# Patient Record
Sex: MALE | NOT HISPANIC OR LATINO | Employment: OTHER | URBAN - METROPOLITAN AREA
[De-identification: names, ages, dates, MRNs, and addresses within clinical notes are randomized per-mention and may not be internally consistent; named-entity substitution may affect disease eponyms.]

---

## 2024-01-09 ENCOUNTER — LAB (OUTPATIENT)
Dept: LAB | Facility: HOSPITAL | Age: 79
End: 2024-01-09
Payer: COMMERCIAL

## 2024-01-09 ENCOUNTER — OFFICE VISIT (OUTPATIENT)
Dept: FAMILY MEDICINE CLINIC | Facility: CLINIC | Age: 79
End: 2024-01-09
Payer: COMMERCIAL

## 2024-01-09 VITALS
WEIGHT: 175.6 LBS | RESPIRATION RATE: 20 BRPM | DIASTOLIC BLOOD PRESSURE: 68 MMHG | HEART RATE: 67 BPM | BODY MASS INDEX: 26.01 KG/M2 | TEMPERATURE: 98.6 F | HEIGHT: 69 IN | SYSTOLIC BLOOD PRESSURE: 122 MMHG | OXYGEN SATURATION: 98 %

## 2024-01-09 DIAGNOSIS — I25.83 CORONARY ARTERY DISEASE DUE TO LIPID RICH PLAQUE: ICD-10-CM

## 2024-01-09 DIAGNOSIS — H40.9 GLAUCOMA, UNSPECIFIED GLAUCOMA TYPE, UNSPECIFIED LATERALITY: ICD-10-CM

## 2024-01-09 DIAGNOSIS — J44.9 COPD MIXED TYPE: ICD-10-CM

## 2024-01-09 DIAGNOSIS — I25.10 CORONARY ARTERY DISEASE DUE TO LIPID RICH PLAQUE: ICD-10-CM

## 2024-01-09 DIAGNOSIS — H40.9 GLAUCOMA, UNSPECIFIED GLAUCOMA TYPE, UNSPECIFIED LATERALITY: Primary | ICD-10-CM

## 2024-01-09 DIAGNOSIS — I10 ESSENTIAL HYPERTENSION: ICD-10-CM

## 2024-01-09 DIAGNOSIS — Z12.2 SCREENING FOR LUNG CANCER: ICD-10-CM

## 2024-01-09 LAB
25(OH)D3 SERPL-MCNC: 47.5 NG/ML (ref 30–100)
ALBUMIN SERPL-MCNC: 4.3 G/DL (ref 3.5–5.2)
ALBUMIN/GLOB SERPL: 1.6 G/DL
ALP SERPL-CCNC: 55 U/L (ref 39–117)
ALT SERPL W P-5'-P-CCNC: 30 U/L (ref 1–41)
ANION GAP SERPL CALCULATED.3IONS-SCNC: 13.6 MMOL/L (ref 5–15)
AST SERPL-CCNC: 28 U/L (ref 1–40)
BILIRUB SERPL-MCNC: 0.6 MG/DL (ref 0–1.2)
BUN SERPL-MCNC: 11 MG/DL (ref 8–23)
BUN/CREAT SERPL: 7.7 (ref 7–25)
CALCIUM SPEC-SCNC: 9.7 MG/DL (ref 8.6–10.5)
CHLORIDE SERPL-SCNC: 101 MMOL/L (ref 98–107)
CHOLEST SERPL-MCNC: 175 MG/DL (ref 0–200)
CO2 SERPL-SCNC: 26.4 MMOL/L (ref 22–29)
CREAT SERPL-MCNC: 1.42 MG/DL (ref 0.76–1.27)
DEPRECATED RDW RBC AUTO: 42.4 FL (ref 37–54)
EGFRCR SERPLBLD CKD-EPI 2021: 50.3 ML/MIN/1.73
ERYTHROCYTE [DISTWIDTH] IN BLOOD BY AUTOMATED COUNT: 13.2 % (ref 12.3–15.4)
GLOBULIN UR ELPH-MCNC: 2.7 GM/DL
GLUCOSE SERPL-MCNC: 86 MG/DL (ref 65–99)
HBA1C MFR BLD: 6 % (ref 4.8–5.6)
HCT VFR BLD AUTO: 45.7 % (ref 37.5–51)
HDLC SERPL-MCNC: 47 MG/DL (ref 40–60)
HGB BLD-MCNC: 15.3 G/DL (ref 13–17.7)
LDLC SERPL CALC-MCNC: 104 MG/DL (ref 0–100)
LDLC/HDLC SERPL: 2.14 {RATIO}
MCH RBC QN AUTO: 29.7 PG (ref 26.6–33)
MCHC RBC AUTO-ENTMCNC: 33.5 G/DL (ref 31.5–35.7)
MCV RBC AUTO: 88.7 FL (ref 79–97)
PLATELET # BLD AUTO: 229 10*3/MM3 (ref 140–450)
PMV BLD AUTO: 10.8 FL (ref 6–12)
POTASSIUM SERPL-SCNC: 4.1 MMOL/L (ref 3.5–5.2)
PROT SERPL-MCNC: 7 G/DL (ref 6–8.5)
RBC # BLD AUTO: 5.15 10*6/MM3 (ref 4.14–5.8)
SODIUM SERPL-SCNC: 141 MMOL/L (ref 136–145)
T4 FREE SERPL-MCNC: 1.19 NG/DL (ref 0.93–1.7)
TRIGL SERPL-MCNC: 137 MG/DL (ref 0–150)
TSH SERPL DL<=0.05 MIU/L-ACNC: 4.36 UIU/ML (ref 0.27–4.2)
VIT B12 BLD-MCNC: 1559 PG/ML (ref 211–946)
VLDLC SERPL-MCNC: 24 MG/DL (ref 5–40)
WBC NRBC COR # BLD AUTO: 6.13 10*3/MM3 (ref 3.4–10.8)

## 2024-01-09 PROCEDURE — 84443 ASSAY THYROID STIM HORMONE: CPT

## 2024-01-09 PROCEDURE — 82607 VITAMIN B-12: CPT

## 2024-01-09 PROCEDURE — 80061 LIPID PANEL: CPT

## 2024-01-09 PROCEDURE — 84439 ASSAY OF FREE THYROXINE: CPT

## 2024-01-09 PROCEDURE — 85027 COMPLETE CBC AUTOMATED: CPT

## 2024-01-09 PROCEDURE — 83036 HEMOGLOBIN GLYCOSYLATED A1C: CPT

## 2024-01-09 PROCEDURE — 80053 COMPREHEN METABOLIC PANEL: CPT

## 2024-01-09 PROCEDURE — 82306 VITAMIN D 25 HYDROXY: CPT

## 2024-01-09 RX ORDER — BRIMONIDINE TARTRATE AND TIMOLOL MALEATE 2; 5 MG/ML; MG/ML
1 SOLUTION OPHTHALMIC 2 TIMES DAILY
COMMUNITY

## 2024-01-09 RX ORDER — POTASSIUM CHLORIDE 750 MG/1
10 CAPSULE, EXTENDED RELEASE ORAL 2 TIMES DAILY
Qty: 90 CAPSULE | Refills: 1 | Status: SHIPPED | OUTPATIENT
Start: 2024-01-09

## 2024-01-09 RX ORDER — FLUTICASONE PROPIONATE AND SALMETEROL XINAFOATE 45; 21 UG/1; UG/1
2 AEROSOL, METERED RESPIRATORY (INHALATION)
Qty: 1 EACH | Refills: 3 | Status: SHIPPED | OUTPATIENT
Start: 2024-01-09 | End: 2024-01-09 | Stop reason: SDUPTHER

## 2024-01-09 RX ORDER — PERPHENAZINE/AMITRIPTYLINE HCL 4 MG-25 MG
40 TABLET ORAL DAILY
COMMUNITY

## 2024-01-09 RX ORDER — POTASSIUM CHLORIDE 750 MG/1
10 CAPSULE, EXTENDED RELEASE ORAL 2 TIMES DAILY
COMMUNITY
End: 2024-01-09 | Stop reason: SDUPTHER

## 2024-01-09 RX ORDER — ACETAMINOPHEN 160 MG
2000 TABLET,DISINTEGRATING ORAL DAILY
COMMUNITY

## 2024-01-09 RX ORDER — FLUTICASONE PROPIONATE AND SALMETEROL XINAFOATE 45; 21 UG/1; UG/1
2 AEROSOL, METERED RESPIRATORY (INHALATION)
Qty: 1 EACH | Refills: 3 | Status: SHIPPED | OUTPATIENT
Start: 2024-01-09

## 2024-01-09 RX ORDER — LOSARTAN POTASSIUM AND HYDROCHLOROTHIAZIDE 12.5; 5 MG/1; MG/1
1 TABLET ORAL DAILY
Qty: 90 TABLET | Refills: 3 | Status: SHIPPED | OUTPATIENT
Start: 2024-01-09

## 2024-01-09 RX ORDER — LANOLIN ALCOHOL/MO/W.PET/CERES
1000 CREAM (GRAM) TOPICAL DAILY
COMMUNITY

## 2024-01-09 RX ORDER — MULTIPLE VITAMINS W/ MINERALS TAB 9MG-400MCG
1 TAB ORAL DAILY
COMMUNITY

## 2024-01-09 RX ORDER — POTASSIUM CHLORIDE 750 MG/1
10 CAPSULE, EXTENDED RELEASE ORAL 2 TIMES DAILY
Qty: 90 CAPSULE | Refills: 1 | Status: SHIPPED | OUTPATIENT
Start: 2024-01-09 | End: 2024-01-09 | Stop reason: SDUPTHER

## 2024-01-09 RX ORDER — LOSARTAN POTASSIUM AND HYDROCHLOROTHIAZIDE 12.5; 5 MG/1; MG/1
1 TABLET ORAL DAILY
COMMUNITY
End: 2024-01-09 | Stop reason: SDUPTHER

## 2024-01-09 RX ORDER — ROSUVASTATIN CALCIUM 40 MG/1
40 TABLET, COATED ORAL DAILY
Qty: 90 TABLET | Refills: 3 | Status: SHIPPED | OUTPATIENT
Start: 2024-01-09 | End: 2024-01-09 | Stop reason: SDUPTHER

## 2024-01-09 RX ORDER — ROSUVASTATIN CALCIUM 40 MG/1
40 TABLET, COATED ORAL DAILY
Qty: 90 TABLET | Refills: 3 | Status: SHIPPED | OUTPATIENT
Start: 2024-01-09

## 2024-01-09 RX ORDER — FLUTICASONE PROPIONATE 50 MCG
2 SPRAY, SUSPENSION (ML) NASAL DAILY
COMMUNITY

## 2024-01-09 RX ORDER — ASPIRIN 81 MG/1
81 TABLET ORAL DAILY
COMMUNITY

## 2024-01-09 RX ORDER — FLUTICASONE PROPIONATE AND SALMETEROL XINAFOATE 45; 21 UG/1; UG/1
2 AEROSOL, METERED RESPIRATORY (INHALATION)
COMMUNITY
End: 2024-01-09 | Stop reason: SDUPTHER

## 2024-01-09 RX ORDER — LOSARTAN POTASSIUM AND HYDROCHLOROTHIAZIDE 12.5; 5 MG/1; MG/1
1 TABLET ORAL DAILY
Qty: 90 TABLET | Refills: 3 | Status: SHIPPED | OUTPATIENT
Start: 2024-01-09 | End: 2024-01-09 | Stop reason: SDUPTHER

## 2024-01-09 RX ORDER — ROSUVASTATIN CALCIUM 40 MG/1
40 TABLET, COATED ORAL DAILY
COMMUNITY
End: 2024-01-09 | Stop reason: SDUPTHER

## 2024-01-09 NOTE — PROGRESS NOTES
New Patient Office Visit      Patient Name: Rufino Ulloa  : 1945   MRN: [id number]     Chief Complaint:  Establish Care and Med Refill     History of Present Illness:       He just moved back to [place] to establish care. Will request records from his previous pcp . He says there isnt a pcp to ask but can request from [facility].       Smoking/lungs  He used to smoke but stopped 15 years ago.   He says he feels he has some copd .       Htn  Controlled on his medication.  He takes his potassium daily.      cad  Seen by cardiology   History of 6 stents five years ago.   He does not have any chest pain , sob palpitations, leg swelling.     Subjective        Past Medical History:   Diagnosis Date    Glaucoma     History of heart attack     pt states he had a mild heart attack    Hyperlipemia     Hypertension     Pneumonia        Past Surgical History:   Procedure Laterality Date    CORONARY STENT PLACEMENT      7 stents from 3470-1635       No family history on file.    Social History     Socioeconomic History    Marital status: Single   Tobacco Use    Smoking status: Former     Packs/day: 0.50     Years: 33.00     Additional pack years: 0.00     Total pack years: 16.50     Types: Cigarettes     Start date:      Quit date:      Years since quittin.0     Passive exposure: Past    Smokeless tobacco: Never   Vaping Use    Vaping Use: Never used   Substance and Sexual Activity    Alcohol use: Yes     Comment: socially    Drug use: Never    Sexual activity: Defer          Current Outpatient Medications:     aspirin 81 MG EC tablet, Take 1 tablet by mouth Daily., Disp: , Rfl:     brimonidine-timolol (COMBIGAN) 0.2-0.5 % ophthalmic solution, Administer 1 drop to both eyes 2 (Two) Times a Day., Disp: , Rfl:     Cholecalciferol (Vitamin D3) 50 MCG (2000) capsule, Take 1 capsule by mouth Daily., Disp: , Rfl:     fluticasone (FLONASE) 50 MCG/ACT nasal spray, 2 sprays into the nostril(s) as directed by  "provider Daily., Disp: , Rfl:     fluticasone-salmeterol (ADVAIR HFA) 45-21 MCG/ACT inhaler, Inhale 2 puffs 2 (Two) Times a Day., Disp: 1 each, Rfl: 3    losartan-hydrochlorothiazide (HYZAAR) 50-12.5 MG per tablet, Take 1 tablet by mouth Daily., Disp: 90 tablet, Rfl: 3    Lutein 40 MG capsule, Take 40 mg by mouth Daily., Disp: , Rfl:     multivitamin with minerals (Centrum Men) tablet tablet, Take 1 tablet by mouth Daily., Disp: , Rfl:     potassium chloride (MICRO-K) 10 MEQ CR capsule, Take 1 capsule by mouth 2 (Two) Times a Day., Disp: 90 capsule, Rfl: 1    rosuvastatin (CRESTOR) 40 MG tablet, Take 1 tablet by mouth Daily., Disp: 90 tablet, Rfl: 3    vitamin B-12 (CYANOCOBALAMIN) 1000 MCG tablet, Take 1 tablet by mouth Daily., Disp: , Rfl:     No Known Allergies    Objective     Physical Exam:  Vitals:    01/09/24 0912   BP: 122/68   Pulse: 67   Resp: 20   Temp: 98.6 °F (37 °C)   TempSrc: Temporal   SpO2: 98%   Weight: 79.7 kg (175 lb 9.6 oz)   Height: 175.3 cm (69\")      Body mass index is 25.93 kg/m².     Physical Exam  Constitutional:       General: He is not in acute distress.     Appearance: Normal appearance.   HENT:      Head: Normocephalic and atraumatic.   Eyes:      Extraocular Movements: Extraocular movements intact.   Cardiovascular:      Rate and Rhythm: Normal rate and regular rhythm.      Heart sounds: Murmur heard.   Pulmonary:      Effort: Pulmonary effort is normal. No respiratory distress.      Breath sounds: Normal breath sounds. No stridor. No wheezing, rhonchi or rales.   Skin:     Findings: No rash.   Neurological:      General: No focal deficit present.      Mental Status: He is alert.   Psychiatric:         Mood and Affect: Mood normal.            Assessment / Plan      Assessment/Plan:   Diagnoses and all orders for this visit:    1. Glaucoma, unspecified glaucoma type, unspecified laterality (Primary)  -     Ambulatory Referral to Ophthalmology  -     [facility] (No Diff); Future  -     " Comprehensive Metabolic Panel; Future  -     Hemoglobin A1c; Future  -     Lipid Panel; Future  -     TSH Rfx On Abnormal To Free T4; Future  -     Vitamin B12; Future  -     Vitamin D,25-Hydroxy; Future    2. COPD mixed type  -     Spirometry with Diffusion Capacity & Lung Volumes; Future  -     XR Chest PA & Lateral (In Office)  -     CBC (No Diff); Future  -     Comprehensive Metabolic Panel; Future  -     Hemoglobin A1c; Future  -     Lipid Panel; Future  -     TSH Rfx On Abnormal To Free T4; Future  -     Vitamin B12; Future  -     Vitamin D,25-Hydroxy; Future    3. Screening for lung cancer  -      CT Chest Low Dose Cancer Screening WO; Future    4. Essential hypertension  -     CBC (No Diff); Future  -     Comprehensive Metabolic Panel; Future  -     Hemoglobin A1c; Future  -     Lipid Panel; Future  -     TSH Rfx On Abnormal To Free T4; Future  -     Vitamin B12; Future  -     Vitamin D,25-Hydroxy; Future    5. Coronary artery disease due to lipid rich plaque  -     Hemoglobin A1c; Future  -     Lipid Panel; Future  -     TSH Rfx On Abnormal To Free T4; Future  -     Vitamin B12; Future  -     Vitamin D,25-Hydroxy; Future  -     Adult Transthoracic Echo Complete W/ Cont if Necessary Per Protocol; Future    Other orders  -     Discontinue: fluticasone-salmeterol (ADVAIR HFA) 45-21 MCG/ACT inhaler; Inhale 2 puffs 2 (Two) Times a Day.  Dispense: 1 each; Refill: 3  -     Discontinue: losartan-hydrochlorothiazide (HYZAAR) 50-12.5 MG per tablet; Take 1 tablet by mouth Daily.  Dispense: 90 tablet; Refill: 3  -     Discontinue: potassium chloride (MICRO-K) 10 MEQ CR capsule; Take 1 capsule by mouth 2 (Two) Times a Day.  Dispense: 90 capsule; Refill: 1  -     Discontinue: rosuvastatin (CRESTOR) 40 MG tablet; Take 1 tablet by mouth Daily.  Dispense: 90 tablet; Refill: 3  -     losartan-hydrochlorothiazide (HYZAAR) 50-12.5 MG per tablet; Take 1 tablet by mouth Daily.  Dispense: 90 tablet; Refill: 3  -     potassium  chloride (MICRO-K) 10 MEQ CR capsule; Take 1 capsule by mouth 2 (Two) Times a Day.  Dispense: 90 capsule; Refill: 1  -     fluticasone-salmeterol (ADVAIR HFA) 45-21 MCG/ACT inhaler; Inhale 2 puffs 2 (Two) Times a Day.  Dispense: 1 each; Refill: 3  -     rosuvastatin (CRESTOR) 40 MG tablet; Take 1 tablet by mouth Daily.  Dispense: 90 tablet; Refill: 3                 Cad  Not on beta blocker due to low heart rate  Continue asa and statin.   He is now asymptomatic.   He will monitor for symptoms.   EKG at [facility] in 2 months.       Copd  Records requested  He notes a chronic cough, sometimes productive. No fevers. He takes his inhalers.   Will recheck pfts.   Continue inhalers  Check ct lung scan, screening      Return in about 2 months (around 3/9/2024) for Annual.       Maritza Lewis D.O.  [facility]

## 2024-01-11 ENCOUNTER — TELEPHONE (OUTPATIENT)
Dept: FAMILY MEDICINE CLINIC | Facility: CLINIC | Age: 79
End: 2024-01-11
Payer: COMMERCIAL

## 2024-01-11 ENCOUNTER — HOSPITAL ENCOUNTER (OUTPATIENT)
Dept: GENERAL RADIOLOGY | Facility: HOSPITAL | Age: 79
Discharge: HOME | End: 2024-01-11
Admitting: STUDENT IN AN ORGANIZED HEALTH CARE EDUCATION/TRAINING PROGRAM
Payer: COMMERCIAL

## 2024-01-11 PROCEDURE — 71046 X-RAY EXAM CHEST 2 VIEWS: CPT

## 2024-01-11 NOTE — PROGRESS NOTES
Please let the patient know that the diabetic test (a1c) is elevated into the prediabetic not diabetic range. Recommend diet with lean meat fish and vegetables and decreased sugar/carbs with daily exercise. Can refer to phone dietitian if this helps let me know. Recommend to recheck this in 3-6 months.     His thyroid level is on the lower end. If he has any fatigue, dry skin, constipation let me know. Recheck this in 3-6 months.     His kidney function is at 50 percent (normal is above 60). We will await any records to see his baseline. At his follow up visit in 1-2 months we can recheck this with a urine. Have him drop off any old lab tests he may have. Avoid nsaids such as advil aleve ibuprofen and increase hydration.

## 2024-01-11 NOTE — TELEPHONE ENCOUNTER
HUB CAN READ!    ----- Message from Maritza Lewis DO sent at 1/11/2024 12:40 PM EST -----  Please let the patient know that the diabetic test (a1c) is elevated into the prediabetic not diabetic range. Recommend diet with lean meat fish and vegetables and decreased sugar/carbs with daily exercise. Can refer to phone dietitian if this helps let me know. Recommend to recheck this in 3-6 months.     His thyroid level is on the lower end. If he has any fatigue, dry skin, constipation let me know. Recheck this in 3-6 months.     His kidney function is at 50 percent (normal is above 60). We will await any records to see his baseline. At his follow up visit in 1-2 months we can recheck this with a urine. Have him drop off any old lab tests he may have. Avoid nsaids such as advil aleve ibuprofen and increase hydration.

## 2024-01-12 NOTE — TELEPHONE ENCOUNTER
Name: Rufino Ulloa    Relationship: Self    Best Callback Number: [phone]     HUB PROVIDED THE RELAY MESSAGE FROM THE OFFICE   PATIENT VOICED UNDERSTANDING AND HAS NO FURTHER QUESTIONS AT THIS TIME    ADDITIONAL INFORMATION:

## 2024-01-15 DIAGNOSIS — I25.83 CORONARY ARTERY DISEASE DUE TO LIPID RICH PLAQUE: Primary | ICD-10-CM

## 2024-01-15 DIAGNOSIS — I25.10 CORONARY ARTERY DISEASE DUE TO LIPID RICH PLAQUE: Primary | ICD-10-CM

## 2024-01-15 NOTE — PROGRESS NOTES
Please call to let him know he has a tiny amount of fluid seen on the right side of his lung. Given this I have referred him to cardiology for further discussion

## 2024-01-26 ENCOUNTER — HOSPITAL ENCOUNTER (OUTPATIENT)
Dept: PULMONOLOGY | Facility: HOSPITAL | Age: 79
Discharge: HOME | End: 2024-01-26
Payer: COMMERCIAL

## 2024-01-26 DIAGNOSIS — J44.9 COPD MIXED TYPE: ICD-10-CM

## 2024-01-26 PROCEDURE — 94727 GAS DIL/WSHOT DETER LNG VOL: CPT

## 2024-01-26 PROCEDURE — 94010 BREATHING CAPACITY TEST: CPT

## 2024-01-26 PROCEDURE — 94729 DIFFUSING CAPACITY: CPT

## 2024-02-07 ENCOUNTER — OFFICE VISIT (OUTPATIENT)
Dept: CARDIOLOGY | Facility: CLINIC | Age: 79
End: 2024-02-07
Payer: COMMERCIAL

## 2024-02-07 VITALS
HEIGHT: 69 IN | HEART RATE: 66 BPM | SYSTOLIC BLOOD PRESSURE: 108 MMHG | WEIGHT: 174.6 LBS | OXYGEN SATURATION: 98 % | BODY MASS INDEX: 25.86 KG/M2 | DIASTOLIC BLOOD PRESSURE: 62 MMHG

## 2024-02-07 DIAGNOSIS — E78.5 HYPERLIPIDEMIA LDL GOAL <70: ICD-10-CM

## 2024-02-07 DIAGNOSIS — I10 PRIMARY HYPERTENSION: ICD-10-CM

## 2024-02-07 DIAGNOSIS — I25.810 CORONARY ARTERY DISEASE INVOLVING CORONARY BYPASS GRAFT OF NATIVE HEART WITHOUT ANGINA PECTORIS: Primary | ICD-10-CM

## 2024-02-07 PROCEDURE — 3074F SYST BP LT 130 MM HG: CPT | Performed by: INTERNAL MEDICINE

## 2024-02-07 PROCEDURE — 99204 OFFICE O/P NEW MOD 45 MIN: CPT | Performed by: INTERNAL MEDICINE

## 2024-02-07 PROCEDURE — 93000 ELECTROCARDIOGRAM COMPLETE: CPT | Performed by: INTERNAL MEDICINE

## 2024-02-07 PROCEDURE — 3078F DIAST BP <80 MM HG: CPT | Performed by: INTERNAL MEDICINE

## 2024-02-07 NOTE — PROGRESS NOTES
"[facility]  Consultation H&P  Rufino Ulloa  1945  [phone]  There is no work phone number on file..    VISIT DATE:  02/07/2024    PCP: Maritza Lewis DO  [facility] [id number]    CC:  Chief Complaint   Patient presents with    Coronary Artery Disease       Previous cardiac studies and procedures:  1997: MI with PCI  Approximately 2017: Multivessel PCI.  Data insufficient.    ASSESSMENT:   Diagnosis Plan   1. Coronary artery disease involving coronary bypass graft of native heart without angina pectoris        2. Primary hypertension        3. Hyperlipidemia LDL goal <70              PLAN:  Coronary disease: Currently stable asymptomatic.  Continue aspirin, high intensity statin therapy and afterload reduction.    Hypertension: Goal less than 130/80 mmHg.  Currently well-controlled.  Continue current medical therapy.  Associated stage III CKD.    Hyperlipidemia: Goal LDL less than 100, ideally less than 70.  Continue rosuvastatin 40 mg p.o. daily, would not titrate therapy with additional agent unless LDL trends upward.    History of Present Illness   78-year-old prediabetic gentleman with history of coronary disease, hypertension dyslipidemia.  Currently denies chest pain, palpitations or dyspnea on exertion.  Blood pressures running less than 120/80 mmHg.  He is compliant with medical therapy.  Previous road bicyclist.  Reviewed recent laboratory evaluation.  EKG with baseline left bundle branch block.    PHYSICAL EXAMINATION:  Vitals:    02/07/24 1030   BP: 108/62   BP Location: Right arm   Patient Position: Sitting   Pulse: 66   SpO2: 98%   Weight: 79.2 kg (174 lb 9.6 oz)   Height: 175.3 cm (69\")     General Appearance:    Alert, cooperative, no distress, appears stated age   Head:    Normocephalic, without obvious abnormality, atraumatic   Eyes:    conjunctiva/corneas clear, EOM's intact, fundi     benign, both eyes   Ears:    " Normal TM's and external ear canals, both ears   Nose:   Nares normal, septum midline, mucosa normal, no drainage    or sinus tenderness   Throat:   Lips, mucosa, and tongue normal; teeth and gums normal   Neck:   Supple, symmetrical, trachea midline, no adenopathy;     thyroid:  no enlargement/tenderness/nodules; no carotid    bruit or JVD   Back:     Symmetric, no curvature, ROM normal, no CVA tenderness   Lungs:     Clear to auscultation bilaterally, respirations unlabored   Chest Wall:    No tenderness or deformity    Heart:    Regular rate and rhythm, S1 and S2 normal, no murmur, rub   or gallop, normal carotid impulse bilaterally without bruit.   Abdomen:     Soft, non-tender, bowel sounds active all four quadrants,     no masses, no organomegaly   Extremities:   Extremities normal, atraumatic, no cyanosis or edema   Pulses:   2+ and symmetric all extremities   Skin:   Skin color, texture, turgor normal, no rashes or lesions   Lymph nodes:   Cervical, supraclavicular, and axillary nodes normal   Neurologic:   normal strength, sensation intact     throughout       Diagnostic Data:    ECG 12 Lead    Date/Time: 2/7/2024 10:59 AM  Performed by: Sukhjinder Archuleta III, MD    Authorized by: Sukhjinder Archuleta III, MD  Previous ECG: no previous ECG available  Rhythm: sinus rhythm  Conduction: left bundle branch block        Lab Results   Component Value Date    TRIG 137 01/09/2024    HDL 47 01/09/2024     Lab Results   Component Value Date    GLUCOSE 86 01/09/2024    BUN 11 01/09/2024    CREATININE 1.42 (H) 01/09/2024     01/09/2024    K 4.1 01/09/2024     01/09/2024    CO2 26.4 01/09/2024     Lab Results   Component Value Date    HGBA1C 6.00 (H) 01/09/2024     Lab Results   Component Value Date    WBC 6.13 01/09/2024    HGB 15.3 01/09/2024    HCT 45.7 01/09/2024     01/09/2024       PROBLEM LIST:  Patient Active Problem List   Diagnosis    Coronary artery disease involving coronary bypass graft of native  heart without angina pectoris    Primary hypertension    Hyperlipidemia LDL goal <70       PAST MEDICAL HX  Past Medical History:   Diagnosis Date    Glaucoma     History of heart attack     pt states he had a mild heart attack    Hyperlipemia     Hypertension     Pneumonia        Allergies  No Known Allergies    Current Medications    Current Outpatient Medications:     aspirin 81 MG EC tablet, Take 1 tablet by mouth Daily., Disp: , Rfl:     brimonidine-timolol (COMBIGAN) 0.2-0.5 % ophthalmic solution, Administer 1 drop to both eyes 2 (Two) Times a Day., Disp: , Rfl:     Cholecalciferol (Vitamin D3) 50 MCG (2000 UT) capsule, Take 1 capsule by mouth Daily., Disp: , Rfl:     fluticasone (FLONASE) 50 MCG/ACT nasal spray, 2 sprays into the nostril(s) as directed by provider Daily., Disp: , Rfl:     fluticasone-salmeterol (ADVAIR HFA) 45-21 MCG/ACT inhaler, Inhale 2 puffs 2 (Two) Times a Day., Disp: 1 each, Rfl: 3    losartan-hydrochlorothiazide (HYZAAR) 50-12.5 MG per tablet, Take 1 tablet by mouth Daily., Disp: 90 tablet, Rfl: 3    Lutein 40 MG capsule, Take 40 mg by mouth Daily., Disp: , Rfl:     multivitamin with minerals (Centrum Men) tablet tablet, Take 1 tablet by mouth Daily., Disp: , Rfl:     potassium chloride (MICRO-K) 10 MEQ CR capsule, Take 1 capsule by mouth 2 (Two) Times a Day., Disp: 90 capsule, Rfl: 1    rosuvastatin (CRESTOR) 40 MG tablet, Take 1 tablet by mouth Daily., Disp: 90 tablet, Rfl: 3    vitamin B-12 (CYANOCOBALAMIN) 1000 MCG tablet, Take 1 tablet by mouth Daily., Disp: , Rfl:          ROS  ROS      SOCIAL HX  Social History     Socioeconomic History    Marital status: Single   Tobacco Use    Smoking status: Former     Packs/day: 0.50     Years: 33.00     Additional pack years: 0.00     Total pack years: 16.50     Types: Cigarettes     Start date:      Quit date:      Years since quittin.1     Passive exposure: Past    Smokeless tobacco: Never   Vaping Use    Vaping Use: Never  used   Substance and Sexual Activity    Alcohol use: Yes     Comment: socially    Drug use: Never    Sexual activity: Defer       FAMILY HX  Family History   Problem Relation Age of Onset    Colon cancer Mother     No Known Problems Sister     HIV Brother              Sukhjinder Archuleta III, MD, FACC

## 2024-02-16 ENCOUNTER — HOSPITAL ENCOUNTER (OUTPATIENT)
Dept: CT IMAGING | Facility: HOSPITAL | Age: 79
Discharge: HOME | End: 2024-02-16
Payer: COMMERCIAL

## 2024-02-16 DIAGNOSIS — Z12.2 SCREENING FOR LUNG CANCER: ICD-10-CM

## 2024-02-16 PROCEDURE — 71271 CT THORAX LUNG CANCER SCR C-: CPT

## 2024-02-19 DIAGNOSIS — J47.9 BRONCHIECTASIS WITHOUT COMPLICATION: Primary | ICD-10-CM

## 2024-02-21 ENCOUNTER — HOSPITAL ENCOUNTER (OUTPATIENT)
Dept: CARDIOLOGY | Facility: HOSPITAL | Age: 79
Discharge: HOME | End: 2024-02-21
Admitting: STUDENT IN AN ORGANIZED HEALTH CARE EDUCATION/TRAINING PROGRAM
Payer: COMMERCIAL

## 2024-02-21 VITALS
HEIGHT: 69 IN | WEIGHT: 174.6 LBS | SYSTOLIC BLOOD PRESSURE: 142 MMHG | DIASTOLIC BLOOD PRESSURE: 82 MMHG | BODY MASS INDEX: 25.86 KG/M2

## 2024-02-21 DIAGNOSIS — I25.10 CORONARY ARTERY DISEASE DUE TO LIPID RICH PLAQUE: ICD-10-CM

## 2024-02-21 DIAGNOSIS — I25.83 CORONARY ARTERY DISEASE DUE TO LIPID RICH PLAQUE: ICD-10-CM

## 2024-02-21 LAB
ASCENDING AORTA: 3.4 CM
BH CV ECHO MEAS - AO MAX PG: 10.1 MMHG
BH CV ECHO MEAS - AO MEAN PG: 5 MMHG
BH CV ECHO MEAS - AO ROOT DIAM: 3.2 CM
BH CV ECHO MEAS - AO V2 MAX: 159 CM/SEC
BH CV ECHO MEAS - AO V2 VTI: 32.9 CM
BH CV ECHO MEAS - AVA(I,D): 1.93 CM2
BH CV ECHO MEAS - EDV(CUBED): 91.1 ML
BH CV ECHO MEAS - EDV(MOD-SP2): 151 ML
BH CV ECHO MEAS - EDV(MOD-SP4): 179 ML
BH CV ECHO MEAS - EF(MOD-BP): 38.1 %
BH CV ECHO MEAS - EF(MOD-SP2): 37.5 %
BH CV ECHO MEAS - EF(MOD-SP4): 36.9 %
BH CV ECHO MEAS - ESV(CUBED): 32.6 ML
BH CV ECHO MEAS - ESV(MOD-SP2): 94.4 ML
BH CV ECHO MEAS - ESV(MOD-SP4): 113 ML
BH CV ECHO MEAS - FS: 29 %
BH CV ECHO MEAS - IVS/LVPW: 1 CM
BH CV ECHO MEAS - IVSD: 1 CM
BH CV ECHO MEAS - LA DIMENSION: 3.5 CM
BH CV ECHO MEAS - LAT PEAK E' VEL: 7 CM/SEC
BH CV ECHO MEAS - LV DIASTOLIC VOL/BSA (35-75): 91.9 CM2
BH CV ECHO MEAS - LV MASS(C)D: 153.3 GRAMS
BH CV ECHO MEAS - LV MAX PG: 3.6 MMHG
BH CV ECHO MEAS - LV MEAN PG: 2 MMHG
BH CV ECHO MEAS - LV SYSTOLIC VOL/BSA (12-30): 58 CM2
BH CV ECHO MEAS - LV V1 MAX: 94.9 CM/SEC
BH CV ECHO MEAS - LV V1 VTI: 20.4 CM
BH CV ECHO MEAS - LVIDD: 4.5 CM
BH CV ECHO MEAS - LVIDS: 3.2 CM
BH CV ECHO MEAS - LVOT AREA: 3.1 CM2
BH CV ECHO MEAS - LVOT DIAM: 1.99 CM
BH CV ECHO MEAS - LVPWD: 1 CM
BH CV ECHO MEAS - MED PEAK E' VEL: 5.8 CM/SEC
BH CV ECHO MEAS - MV A MAX VEL: 124.9 CM/SEC
BH CV ECHO MEAS - MV DEC SLOPE: 240.9 CM/SEC2
BH CV ECHO MEAS - MV DEC TIME: 0.36 SEC
BH CV ECHO MEAS - MV E MAX VEL: 59.4 CM/SEC
BH CV ECHO MEAS - MV E/A: 0.48
BH CV ECHO MEAS - MV MAX PG: 10 MMHG
BH CV ECHO MEAS - MV MEAN PG: 3.1 MMHG
BH CV ECHO MEAS - MV P1/2T: 91.9 MSEC
BH CV ECHO MEAS - MV V2 VTI: 36.8 CM
BH CV ECHO MEAS - MVA(P1/2T): 2.39 CM2
BH CV ECHO MEAS - MVA(VTI): 1.73 CM2
BH CV ECHO MEAS - PA ACC TIME: 0.12 SEC
BH CV ECHO MEAS - SI(MOD-SP2): 29.1 ML/M2
BH CV ECHO MEAS - SI(MOD-SP4): 33.9 ML/M2
BH CV ECHO MEAS - SV(LVOT): 63.6 ML
BH CV ECHO MEAS - SV(MOD-SP2): 56.6 ML
BH CV ECHO MEAS - SV(MOD-SP4): 66 ML
BH CV ECHO MEAS - TAPSE (>1.6): 1.94 CM
BH CV ECHO MEASUREMENTS AVERAGE E/E' RATIO: 9.28
BH CV VAS BP LEFT ARM: NORMAL MMHG
BH CV XLRA - RV BASE: 3.2 CM
BH CV XLRA - RV LENGTH: 7.6 CM
BH CV XLRA - RV MID: 2.41 CM
BH CV XLRA - TDI S': 9.6 CM/SEC
LEFT ATRIUM VOLUME INDEX: 21.2 ML/M2

## 2024-02-21 PROCEDURE — 93306 TTE W/DOPPLER COMPLETE: CPT | Performed by: INTERNAL MEDICINE

## 2024-02-21 PROCEDURE — 93306 TTE W/DOPPLER COMPLETE: CPT

## 2024-02-21 NOTE — PROGRESS NOTES
Please call the patient to let him know his echo shows his heart function is lower than normal at 38 percent(normal around 50-55).   For this he should work on a healthy diet (lean meats and veggies and avoid sugar/carb/salt) and walk daily for exercise.   He should continue his current meds and schedule a visit with me or with his cardiologist to further discuss this. If he ever starts to have swelling in his legs, trouble breathing or chest pain he should seek care right away as these are signs of a low heart function.

## 2024-02-22 ENCOUNTER — TELEPHONE (OUTPATIENT)
Dept: CARDIOLOGY | Facility: CLINIC | Age: 79
End: 2024-02-22
Payer: COMMERCIAL

## 2024-02-22 NOTE — TELEPHONE ENCOUNTER
----- Message from Sukhjinder Archuleta III, MD sent at 2/21/2024  4:50 PM EST -----  Needs referral to heart and valve clinic for medication titration for cardiomyopathy.  ----- Message -----  From: Maritza Lewis DO  Sent: 2/21/2024   4:33 PM EST  To: Sukhjinder Archuleta III, MD; #    Please call the patient to let him know his echo shows his heart function is lower than normal at 38 percent(normal around 50-55).   For this he should work on a healthy diet (lean meats and veggies and avoid sugar/carb/salt) and walk daily for exercise.   He should continue his current meds and schedule a visit with me or with his cardiologist to further discuss this. If he ever starts to have swelling in his legs, trouble breathing or chest pain he should seek care right away as these are signs of a low heart function.

## 2024-02-23 DIAGNOSIS — R06.02 SHORTNESS OF BREATH: Primary | ICD-10-CM

## 2024-02-23 RX ORDER — METOPROLOL SUCCINATE 25 MG/1
25 TABLET, EXTENDED RELEASE ORAL
Qty: 90 TABLET | Refills: 3 | Status: SHIPPED | OUTPATIENT
Start: 2024-02-23

## 2024-03-07 ENCOUNTER — HOSPITAL ENCOUNTER (OUTPATIENT)
Dept: CARDIOLOGY | Facility: HOSPITAL | Age: 79
Discharge: HOME | End: 2024-03-07
Payer: COMMERCIAL

## 2024-03-07 DIAGNOSIS — I42.0 CARDIOMYOPATHY, DILATED: ICD-10-CM

## 2024-03-07 DIAGNOSIS — R06.09 DYSPNEA ON EXERTION: Primary | ICD-10-CM

## 2024-03-07 DIAGNOSIS — I44.7 LBBB (LEFT BUNDLE BRANCH BLOCK): ICD-10-CM

## 2024-03-07 DIAGNOSIS — R06.02 SHORTNESS OF BREATH: ICD-10-CM

## 2024-03-11 ENCOUNTER — OFFICE VISIT (OUTPATIENT)
Dept: FAMILY MEDICINE CLINIC | Facility: CLINIC | Age: 79
End: 2024-03-11
Payer: COMMERCIAL

## 2024-03-11 ENCOUNTER — LAB (OUTPATIENT)
Dept: LAB | Facility: HOSPITAL | Age: 79
End: 2024-03-11
Payer: COMMERCIAL

## 2024-03-11 VITALS
SYSTOLIC BLOOD PRESSURE: 122 MMHG | OXYGEN SATURATION: 98 % | TEMPERATURE: 97.5 F | BODY MASS INDEX: 26.28 KG/M2 | HEIGHT: 69 IN | WEIGHT: 177.4 LBS | RESPIRATION RATE: 20 BRPM | DIASTOLIC BLOOD PRESSURE: 64 MMHG | HEART RATE: 64 BPM

## 2024-03-11 DIAGNOSIS — Z12.5 SCREENING FOR PROSTATE CANCER: ICD-10-CM

## 2024-03-11 DIAGNOSIS — I42.9 CARDIOMYOPATHY, UNSPECIFIED TYPE: ICD-10-CM

## 2024-03-11 DIAGNOSIS — R79.89 ABNORMAL TSH: ICD-10-CM

## 2024-03-11 DIAGNOSIS — N18.9 CHRONIC KIDNEY DISEASE, UNSPECIFIED CKD STAGE: Primary | ICD-10-CM

## 2024-03-11 DIAGNOSIS — N18.9 CHRONIC KIDNEY DISEASE, UNSPECIFIED CKD STAGE: ICD-10-CM

## 2024-03-11 LAB
ALBUMIN SERPL-MCNC: 4.4 G/DL (ref 3.5–5.2)
ALBUMIN/GLOB SERPL: 1.8 G/DL
ALP SERPL-CCNC: 47 U/L (ref 39–117)
ALT SERPL W P-5'-P-CCNC: 18 U/L (ref 1–41)
ANION GAP SERPL CALCULATED.3IONS-SCNC: 9 MMOL/L (ref 5–15)
AST SERPL-CCNC: 19 U/L (ref 1–40)
BACTERIA UR QL AUTO: ABNORMAL /HPF
BILIRUB SERPL-MCNC: 0.6 MG/DL (ref 0–1.2)
BILIRUB UR QL STRIP: NEGATIVE
BUN SERPL-MCNC: 23 MG/DL (ref 8–23)
BUN/CREAT SERPL: 21.3 (ref 7–25)
CALCIUM SPEC-SCNC: 9.7 MG/DL (ref 8.6–10.5)
CHLORIDE SERPL-SCNC: 102 MMOL/L (ref 98–107)
CLARITY UR: ABNORMAL
CO2 SERPL-SCNC: 28 MMOL/L (ref 22–29)
COLOR UR: YELLOW
CREAT SERPL-MCNC: 1.08 MG/DL (ref 0.76–1.27)
EGFRCR SERPLBLD CKD-EPI 2021: 69.8 ML/MIN/1.73
GLOBULIN UR ELPH-MCNC: 2.4 GM/DL
GLUCOSE SERPL-MCNC: 80 MG/DL (ref 65–99)
GLUCOSE UR STRIP-MCNC: NEGATIVE MG/DL
HGB UR QL STRIP.AUTO: ABNORMAL
HYALINE CASTS UR QL AUTO: ABNORMAL /LPF
KETONES UR QL STRIP: NEGATIVE
LEUKOCYTE ESTERASE UR QL STRIP.AUTO: NEGATIVE
NITRITE UR QL STRIP: NEGATIVE
PH UR STRIP.AUTO: 6 [PH] (ref 5–8)
POTASSIUM SERPL-SCNC: 4.2 MMOL/L (ref 3.5–5.2)
PROT SERPL-MCNC: 6.8 G/DL (ref 6–8.5)
PROT UR QL STRIP: ABNORMAL
PSA SERPL-MCNC: 2.34 NG/ML (ref 0–4)
RBC # UR STRIP: ABNORMAL /HPF
REF LAB TEST METHOD: ABNORMAL
SODIUM SERPL-SCNC: 139 MMOL/L (ref 136–145)
SP GR UR STRIP: 1.02 (ref 1–1.03)
SQUAMOUS #/AREA URNS HPF: ABNORMAL /HPF
TSH SERPL DL<=0.05 MIU/L-ACNC: 3.31 UIU/ML (ref 0.27–4.2)
UROBILINOGEN UR QL STRIP: ABNORMAL
WBC # UR STRIP: ABNORMAL /HPF

## 2024-03-11 PROCEDURE — G0103 PSA SCREENING: HCPCS

## 2024-03-11 PROCEDURE — 81001 URINALYSIS AUTO W/SCOPE: CPT

## 2024-03-11 PROCEDURE — 84443 ASSAY THYROID STIM HORMONE: CPT

## 2024-03-11 PROCEDURE — 36415 COLL VENOUS BLD VENIPUNCTURE: CPT

## 2024-03-11 PROCEDURE — 80053 COMPREHEN METABOLIC PANEL: CPT

## 2024-03-11 NOTE — PROGRESS NOTES
The ABCs of the Annual Wellness Visit  Subsequent Medicare Wellness Visit    Subjective    Rufino Ulloa is a 78 y.o. male who presents for a Subsequent Medicare Wellness Visit.    The following portions of the patient's history were reviewed and   updated as appropriate: allergies, current medications, past family history, past medical history, past social history, past surgical history, and problem list.    Compared to one year ago, the patient feels his physical   health is the same.    Compared to one year ago, the patient feels his mental   health is the same.    Recent Hospitalizations:  He was not admitted to the hospital during the last year.       Current Medical Providers:  Patient Care Team:  Maritza Lewis DO as PCP - General (Family Medicine)  Sukhjinder Archuleta III, MD as Cardiologist (Cardiology)    Outpatient Medications Prior to Visit   Medication Sig Dispense Refill    aspirin 81 MG EC tablet Take 1 tablet by mouth Daily.      brimonidine-timolol (COMBIGAN) 0.2-0.5 % ophthalmic solution Administer 1 drop to both eyes 2 (Two) Times a Day.      Cholecalciferol (Vitamin D3) 50 MCG (2000 UT) capsule Take 1 capsule by mouth Daily.      fluticasone-salmeterol (ADVAIR HFA) 45-21 MCG/ACT inhaler Inhale 2 puffs 2 (Two) Times a Day. 1 each 3    losartan-hydrochlorothiazide (HYZAAR) 50-12.5 MG per tablet Take 1 tablet by mouth Daily. 90 tablet 3    Lutein 40 MG capsule Take 40 mg by mouth Daily.      metoprolol succinate XL (TOPROL-XL) 25 MG 24 hr tablet Take 1 tablet by mouth every night at bedtime. 90 tablet 3    multivitamin with minerals (Centrum Men) tablet tablet Take 1 tablet by mouth Daily.      potassium chloride (MICRO-K) 10 MEQ CR capsule Take 1 capsule by mouth 2 (Two) Times a Day. 90 capsule 1    rosuvastatin (CRESTOR) 40 MG tablet Take 1 tablet by mouth Daily. 90 tablet 3    vitamin B-12 (CYANOCOBALAMIN) 1000 MCG tablet Take 1 tablet by mouth Daily.      fluticasone (FLONASE) 50 MCG/ACT nasal  "spray 2 sprays into the nostril(s) as directed by provider Daily. (Patient not taking: Reported on 3/11/2024)       No facility-administered medications prior to visit.       No opioid medication identified on active medication list. I have reviewed chart for other potential  high risk medication/s and harmful drug interactions in the elderly.            Patient Active Problem List   Diagnosis    Coronary artery disease involving coronary bypass graft of native heart without angina pectoris    Primary hypertension    Hyperlipidemia LDL goal <70     Advance Care Planning   Advance Care Planning     Advance Directive is not on file.  Info provided     Objective    Vitals:    24 0848   BP: 122/64   Pulse: 64   Resp: 20   Temp: 97.5 °F (36.4 °C)   TempSrc: Temporal   SpO2: 98%   Weight: 80.5 kg (177 lb 6.4 oz)   Height: 175.3 cm (69.02\")     Estimated body mass index is 26.19 kg/m² as calculated from the following:    Height as of this encounter: 175.3 cm (69.02\").    Weight as of this encounter: 80.5 kg (177 lb 6.4 oz).           Does the patient have evidence of cognitive impairment? No    Lab Results   Component Value Date    TRIG 137 2024    HDL 47 2024     (H) 2024    VLDL 24 2024    HGBA1C 6.00 (H) 2024        HEALTH RISK ASSESSMENT    Smoking Status:  Social History     Tobacco Use   Smoking Status Former    Current packs/day: 0.00    Average packs/day: 0.7 packs/day for 49.5 years (33.0 ttl pk-yrs)    Types: Cigarettes    Start date: 1960    Quit date: 1998    Years since quittin.2    Passive exposure: Past   Smokeless Tobacco Never   Tobacco Comments    It took me five years to quit smoking.     Alcohol Consumption:  Social History     Substance and Sexual Activity   Alcohol Use Not Currently    Comment: I will drink on occasion, but not frequently     Fall Risk Screen:    LORI Fall Risk Assessment was completed, and patient is at LOW risk for " "falls.Assessment completed on:2024    Depression Screenin/9/2024     9:12 AM   PHQ-2/PHQ-9 Depression Screening   Little Interest or Pleasure in Doing Things 0-->not at all   Feeling Down, Depressed or Hopeless 0-->not at all   PHQ-9: Brief Depression Severity Measure Score 0       Health Habits and Functional and Cognitive Screening:       No data to display                Age-appropriate Screening Schedule:  Refer to the list below for future screening recommendations based on patient's age, sex and/or medical conditions. Orders for these recommended tests are listed in the plan section. The patient has been provided with a written plan.    Health Maintenance   Topic Date Due    Pneumococcal Vaccine 65+ (1 of 2 - PCV) Never done    TDAP/TD VACCINES (1 - Tdap) Never done    ZOSTER VACCINE (1 of 2) Never done    RSV Vaccine - Adults (1 - 1-dose 60+ series) Never done    INFLUENZA VACCINE  Never done    HEPATITIS C SCREENING  Never done    LIPID PANEL  2025    BMI FOLLOWUP  2025    ANNUAL WELLNESS VISIT  2025    COVID-19 Vaccine  Completed                  CMS Preventative Services Quick Reference  Risk Factors Identified During Encounter  Immunizations Discussed/Encouraged: Influenza  The above risks/problems have been discussed with the patient.  Pertinent information has been shared with the patient in the After Visit Summary.  An After Visit Summary and PPPS were made available to the patient.    Follow Up:   Next Medicare Wellness visit to be scheduled in 1 year.       Additional E&M Note during same encounter follows:  Patient has multiple medical problems which are significant and separately identifiable that require additional work above and beyond the Medicare Wellness Visit.      Chief Complaint  Annual Exam    Subjective        HPI  Rufino Juan Carlos Ulloa        Objective   Vital Signs:  /64   Pulse 64   Temp 97.5 °F (36.4 °C) (Temporal)   Resp 20   Ht 175.3 cm (69.02\") "   Wt 80.5 kg (177 lb 6.4 oz)   SpO2 98%   BMI 26.19 kg/m²     Physical Exam  Constitutional:       General: He is not in acute distress.     Appearance: Normal appearance.   HENT:      Head: Normocephalic and atraumatic.   Eyes:      Extraocular Movements: Extraocular movements intact.   Cardiovascular:      Rate and Rhythm: Normal rate and regular rhythm.      Heart sounds: No murmur heard.  Pulmonary:      Effort: Pulmonary effort is normal. No respiratory distress.      Breath sounds: Normal breath sounds. No stridor. No wheezing, rhonchi or rales.   Skin:     Findings: No rash.   Neurological:      General: No focal deficit present.      Mental Status: He is alert.   Psychiatric:         Mood and Affect: Mood normal.                   Assessment and Plan   Diagnoses and all orders for this visit:    1. Chronic kidney disease, unspecified CKD stage (Primary)  -     Comprehensive metabolic panel; Future  -     Urinalysis With Microscopic - Urine, Clean Catch; Future    2. Abnormal TSH  -     TSH Rfx On Abnormal To Free T4; Future    Other orders  -     Pneumococcal Conjugate Vaccine 20-Valent (PCV20)             Annual exam  Colonoscopy: recommended to screen for colon cancer. He declines. [facility]. Will request record  Lung cancer screen: up to date     Psa: risk/benefit discussed with patient. Ordered.     hiv hep c screening:  he declines.     a1c /lipid screening up to date     Vaccines recommended:  covid vaccine  Rsv vaccine  Shingles  Pneumonia  Tdap      Abnormal imaging/echo  He says his cough has improved. Scheduled with pulm.  He does not have any sob.   He denies any chest pain.   The most exercise he does is walking and he has no issue with this.   He has an apt scheduled for his stress test given decreased ef but no symptoms at this time.   I discussed with him that he does have asymptomatic heart failure . He will continue beta blocker, lisionpril, asa  He will monitor for any  symptoms we discussed these are inability to do what he used to or any cp, leg swelling or sob. No pleural effusion seen on most recent chest imaging, CT scan.   We will initiate lasix at that time.   Will refer him to cardiology, valve clinic to get him switched to entresto.       Follow Up   Return in about 3 months (around 6/11/2024).  Patient was given instructions and counseling regarding his condition or for health maintenance advice. Please see specific information pulled into the AVS if appropriate.

## 2024-03-11 NOTE — LETTER
[facility]  Vaccine Consent Form    Patient Name:  Rufino Ulloa  Patient :  1945     Vaccine(s) Ordered    Pneumococcal Conjugate Vaccine 20-Valent (PCV20)        Screening Checklist  The following questions should be completed prior to vaccination. If you answer “yes” to any question, it does not necessarily mean you should not be vaccinated. It just means we may need to clarify or ask more questions. If a question is unclear, please ask your healthcare provider to explain it.    Yes No   Any fever or moderate to severe illness today (mild illness and/or antibiotic treatment are not contraindications)?     Do you have a history of a serious reaction to any previous vaccinations, such as anaphylaxis, encephalopathy within 7 days, Guillain-[place] syndrome within 6 weeks, seizure?     Have you received any live vaccine(s) (e.g MMR, KAVON) or any other vaccines in the last month (to ensure duplicate doses aren't given)?     Do you have an anaphylactic allergy to latex (DTaP, DTaP-IPV, Hep A, Hep B, MenB, RV, Td, Tdap), baker’s yeast (Hep B, HPV), polysorbates (RSV, nirsevimab, PCV 20, Rotavirrus, Tdap, Shingrix), or gelatin (KAVON, MMR)?     Do you have an anaphylactic allergy to neomycin (Rabies, KAVON, MMR, IPV, Hep A), polymyxin B (IPV), or streptomycin (IPV)?      Any cancer, leukemia, AIDS, or other immune system disorder? (KAVON, MMR, RV)     Do you have a parent, brother, or sister with an immune system problem (if immune competence of vaccine recipient clinically verified, can proceed)? (MMR, KAVON)     Any recent steroid treatments for >2 weeks, chemotherapy, or radiation treatment? (KAVON, MMR)     Have you received antibody-containing blood transfusions or IVIG in the past 11 months (recommended interval is dependent on product)? (MMR, KAVON)     Have you taken antiviral drugs (acyclovir, famciclovir, valacyclovir for KAVON) in the last 24 or 48 hours, respectively?      Are you pregnant or planning to  "become pregnant within 1 month? (KAVON, MMR, HPV, IPV, MenB, Abrexvy; For Hep B- refer to Engerix-B; For RSV - Abrysvo is indicated for 32-36 weeks of pregnancy from September to January)     For infants, have you ever been told your child has had intussusception or a medical emergency involving obstruction of the intestine (Rotavirus)? If not for an infant, can skip this question.         *Ordering Physicians/APC should be consulted if \"yes\" is checked by the patient or guardian above.  I have received, read, and understand the Vaccine Information Statement (VIS) for each vaccine ordered.  I have considered my or my child's health status as well as the health status of my close contacts.  I have taken the opportunity to discuss my vaccine questions with my or my child's health care provider.   I have requested that the ordered vaccine(s) be given to me or my child.  I understand the benefits and risks of the vaccines.  I understand that I should remain in the clinic for 15 minutes after receiving the vaccine(s).  _________________________________________________________  Signature of Patient or Parent/Legal Guardian ____________________  Date     "

## 2024-03-12 DIAGNOSIS — R31.29 MICROSCOPIC HEMATURIA: Primary | ICD-10-CM

## 2024-03-12 NOTE — PROGRESS NOTES
Please call the patient to let him know he has signs of blood in his urine. Does he have any symptoms of pain when urinating, frequency, or urge to urinate? If so let me know. I have ordered a ct scan for him to rule out kidney stones as a cause of blood in the urine and have referred him to the urology (kidney specialist).

## 2024-03-19 ENCOUNTER — TELEPHONE (OUTPATIENT)
Dept: FAMILY MEDICINE CLINIC | Facility: CLINIC | Age: 79
End: 2024-03-19
Payer: COMMERCIAL

## 2024-03-19 DIAGNOSIS — H40.9 GLAUCOMA, UNSPECIFIED GLAUCOMA TYPE, UNSPECIFIED LATERALITY: Primary | ICD-10-CM

## 2024-03-19 NOTE — TELEPHONE ENCOUNTER
Amy from [facility] called and patient insurance is requiring a referral for them to treat patient for Glaucoma  the fax number [phone]

## 2024-03-21 ENCOUNTER — OFFICE VISIT (OUTPATIENT)
Dept: CARDIOLOGY | Facility: HOSPITAL | Age: 79
End: 2024-03-21
Payer: COMMERCIAL

## 2024-03-21 VITALS
SYSTOLIC BLOOD PRESSURE: 125 MMHG | WEIGHT: 175.6 LBS | HEIGHT: 69 IN | TEMPERATURE: 97.8 F | OXYGEN SATURATION: 98 % | HEART RATE: 68 BPM | DIASTOLIC BLOOD PRESSURE: 70 MMHG | BODY MASS INDEX: 26.01 KG/M2 | RESPIRATION RATE: 16 BRPM

## 2024-03-21 DIAGNOSIS — I25.10 CORONARY ARTERY DISEASE DUE TO LIPID RICH PLAQUE: ICD-10-CM

## 2024-03-21 DIAGNOSIS — I25.83 CORONARY ARTERY DISEASE DUE TO LIPID RICH PLAQUE: ICD-10-CM

## 2024-03-21 DIAGNOSIS — I50.20 HEART FAILURE WITH REDUCED EJECTION FRACTION: Primary | ICD-10-CM

## 2024-03-21 DIAGNOSIS — E78.5 HYPERLIPIDEMIA LDL GOAL <70: ICD-10-CM

## 2024-03-21 DIAGNOSIS — I10 PRIMARY HYPERTENSION: ICD-10-CM

## 2024-03-21 DIAGNOSIS — I44.7 LBBB (LEFT BUNDLE BRANCH BLOCK): ICD-10-CM

## 2024-03-21 NOTE — PROGRESS NOTES
"[facility], [facility] Heart and Vascular    Chief Complaint  Cardiomyopathy    Subjective    History of Present Illness {CC  Problem List  Visit  Diagnosis   Encounters  Notes  Medications  Labs  Result Review Imaging  Media :23}     Rufino Ulloa presents to [facility] CARDIOLOGY for   History of Present Illness     78-year-old male with history of prediabetes, CAD hx of MI 1990 with previous stents, hypertension, dyslipidemia, left bundle branch block, chronic kidney disease stage III.    Patient had an echocardiogram completed 2/21/2024: EF 36 to 40%, grade 1 diastolic dysfunction.    Due to newly reduced EF stress test ordered.  Scheduled for 3/27/2024.    Current heart failure medications include metoprolol succinate, ARB.    No CP or pressure, dyspnea, dizziness, near syncope, syncope, edema. Weight is stable.  No PND/orthopnea. No palpitations.       Objective     Vital Signs:   Vitals:    03/21/24 0856 03/21/24 0857   BP: 134/67 125/70   BP Location: Left arm Left arm   Patient Position: Sitting Standing   Cuff Size: Adult Adult   Pulse: 63 68   Resp: 16    Temp: 97.8 °F (36.6 °C)    TempSrc: Temporal    SpO2: 98%    Weight: 79.7 kg (175 lb 9.6 oz)    Height: 175.3 cm (69\")      Body mass index is 25.93 kg/m².  Physical Exam  Vitals reviewed.   Constitutional:       General: He is not in acute distress.  Neck:      Vascular: No carotid bruit.   Cardiovascular:      Rate and Rhythm: Normal rate and regular rhythm.      Heart sounds: No murmur heard.  Pulmonary:      Effort: Pulmonary effort is normal.      Breath sounds: Normal breath sounds.   Abdominal:      Palpations: Abdomen is soft.   Musculoskeletal:      Right lower leg: No edema.      Left lower leg: No edema.   Skin:     Coloration: Skin is not pale.   Neurological:      Mental Status: He is alert.   Psychiatric:         Mood and Affect: Mood normal.         Behavior: Behavior normal. " Behavior is cooperative.              Result Review  Data Reviewed:{ Labs  Result Review  Imaging  Med Tab  Media :23}   echocardiogram completed 2/21/2024: EF 36 to 40%, grade 1 diastolic dysfunction.    .[facility]  Lab Results   Component Value Date    CHOL 175 01/09/2024    TRIG 137 01/09/2024    HDL 47 01/09/2024     (H) 01/09/2024     Lab Results   Component Value Date    WBC 6.13 01/09/2024    HGB 15.3 01/09/2024    HCT 45.7 01/09/2024    MCV 88.7 01/09/2024     01/09/2024     Lab Results   Component Value Date    GLUCOSE 80 03/11/2024    BUN 23 03/11/2024    CREATININE 1.08 03/11/2024    EGFR 69.8 03/11/2024    BCR 21.3 03/11/2024    K 4.2 03/11/2024    CO2 28.0 03/11/2024    CALCIUM 9.7 03/11/2024    ALBUMIN 4.4 03/11/2024    BILITOT 0.6 03/11/2024    AST 19 03/11/2024    ALT 18 03/11/2024     Lab Results   Component Value Date    HGBA1C 6.00 (H) 01/09/2024                   Assessment and Plan {CC Problem List  Visit Diagnosis  ROS  Review (Popup)  Health Maintenance  Quality  BestPractice  Medications  SmartSets  SnapShot Encounters  Media :23}   1. Heart failure with reduced ejection fraction  EF 36-40%  Patient appears to be euvolemic.  Currently not requiring diuretics.  May consider spironolactone at follow-up.  Previous creatinine normal, but labs prior to that showed elevated creatinine.     HF education discussed.  Reviewed signs and symptoms, role the heart valve center monitoring and management. Med management.      2. Coronary artery disease due to lipid rich plaque  Hx of MI 1990 with stents  Asa statin    Stress test scheduled, indications for stress test.     3. Primary hypertension  BP controlled    4. Hyperlipidemia LDL goal <70  Statin.      5. LBBB (left bundle branch block)            Follow Up {Instructions Charge Capture  Follow-up Communications :23}   Return in about 3 months (around 6/21/2024), or if symptoms worsen or fail to improve, for  HF.    Patient was given instructions and counseling regarding his condition or for health maintenance advice. Please see specific information pulled into the AVS if appropriate.  Patient was instructed to call the Heart and Valve Center with any questions, concerns, or worsening symptoms.

## 2024-03-25 ENCOUNTER — OFFICE VISIT (OUTPATIENT)
Dept: FAMILY MEDICINE CLINIC | Facility: CLINIC | Age: 79
End: 2024-03-25
Payer: COMMERCIAL

## 2024-03-25 VITALS
BODY MASS INDEX: 25.92 KG/M2 | TEMPERATURE: 98.6 F | DIASTOLIC BLOOD PRESSURE: 70 MMHG | OXYGEN SATURATION: 97 % | SYSTOLIC BLOOD PRESSURE: 112 MMHG | HEART RATE: 65 BPM | HEIGHT: 69 IN | RESPIRATION RATE: 20 BRPM | WEIGHT: 175 LBS

## 2024-03-25 DIAGNOSIS — I25.810 CORONARY ARTERY DISEASE INVOLVING CORONARY BYPASS GRAFT OF NATIVE HEART WITHOUT ANGINA PECTORIS: ICD-10-CM

## 2024-03-25 DIAGNOSIS — R31.29 MICROSCOPIC HEMATURIA: Primary | ICD-10-CM

## 2024-03-25 PROCEDURE — 99214 OFFICE O/P EST MOD 30 MIN: CPT | Performed by: STUDENT IN AN ORGANIZED HEALTH CARE EDUCATION/TRAINING PROGRAM

## 2024-03-25 PROCEDURE — 3078F DIAST BP <80 MM HG: CPT | Performed by: STUDENT IN AN ORGANIZED HEALTH CARE EDUCATION/TRAINING PROGRAM

## 2024-03-25 PROCEDURE — 3074F SYST BP LT 130 MM HG: CPT | Performed by: STUDENT IN AN ORGANIZED HEALTH CARE EDUCATION/TRAINING PROGRAM

## 2024-03-25 NOTE — PROGRESS NOTES
"Chief Complaint  Chronic Kidney Disease (2 wk fu)    Chronic Kidney Disease        Microscopic hematuria  He denies any blood seen in urine.   He denies any symptoms of uti such as burning. He does urinate frequently but he drinks a lot. He does wake up in the night. No urgency or fever. He feels he can go 4 hours without urinating. He denies any symptoms of incomplete void or dribbling, but says occasionally will have to pee twice in the same bathroom visit.       Asymptomatic cardiomyopathy  Reviewed note from cardiology. He remains asymptomatic.     The following portions of the patient's history were reviewed and updated as appropriate: allergies, current medications, past family history, past medical history, past social history, past surgical history, and problem list.    OBJECTIVE:  /70   Pulse 65   Temp 98.6 °F (37 °C) (Temporal)   Resp 20   Ht 175.3 cm (69.02\")   Wt 79.4 kg (175 lb)   SpO2 97%   BMI 25.83 kg/m²       Physical Exam  Constitutional:       General: He is not in acute distress.     Appearance: Normal appearance.   HENT:      Head: Normocephalic and atraumatic.   Eyes:      Extraocular Movements: Extraocular movements intact.   Cardiovascular:      Rate and Rhythm: Normal rate and regular rhythm.      Heart sounds: No murmur heard.  Pulmonary:      Effort: Pulmonary effort is normal. No respiratory distress.      Breath sounds: Normal breath sounds. No stridor. No wheezing, rhonchi or rales.   Skin:     Findings: No rash.   Neurological:      General: No focal deficit present.      Mental Status: He is alert.   Psychiatric:         Mood and Affect: Mood normal.                    Assessment and Plan   Diagnoses and all orders for this visit:    1. Microscopic hematuria (Primary)    2. Coronary artery disease involving coronary bypass graft of native heart without angina pectoris      NM scheduled 3/27. Reviewed cardiology note. He will be considered for spironolactone/entresto. "   Counseled him on symptoms of heart failure to let me know if he develops any of these (sob, leg swelling, fatigue).         Hematuria  Microscopic  No concern for uti given no symptoms.  Ct scan ordered.  Referred to urology after ct scan.       Return in about 3 months (around 6/25/2024).       Maritza Lewis D.O.  [facility] [place]

## 2024-03-26 ENCOUNTER — HOSPITAL ENCOUNTER (OUTPATIENT)
Dept: CT IMAGING | Facility: HOSPITAL | Age: 79
Discharge: HOME | End: 2024-03-26
Admitting: STUDENT IN AN ORGANIZED HEALTH CARE EDUCATION/TRAINING PROGRAM
Payer: COMMERCIAL

## 2024-03-26 DIAGNOSIS — R31.29 MICROSCOPIC HEMATURIA: ICD-10-CM

## 2024-03-26 PROCEDURE — 74176 CT ABD & PELVIS W/O CONTRAST: CPT

## 2024-03-26 RX ORDER — TAMSULOSIN HYDROCHLORIDE 0.4 MG/1
1 CAPSULE ORAL DAILY
Qty: 30 CAPSULE | Refills: 0 | Status: SHIPPED | OUTPATIENT
Start: 2024-03-26

## 2024-03-26 NOTE — PROGRESS NOTES
Please call the patient to let him know he has a larger stone in the right side and smaller ones in both kidneys. I have called in a med called flomax that can help this pass. Have him stop it for any low bp below 100 systolic or for any dizziness. He needs to increase fluids and avoid salt. He can strain the urine to collect the stone to take to us or urology. Have him seek care immediately for any fever or moderate to severe pain.

## 2024-03-27 ENCOUNTER — HOSPITAL ENCOUNTER (OUTPATIENT)
Dept: CARDIOLOGY | Facility: HOSPITAL | Age: 79
Discharge: HOME | End: 2024-03-27
Payer: COMMERCIAL

## 2024-03-27 VITALS
WEIGHT: 175 LBS | HEIGHT: 69 IN | SYSTOLIC BLOOD PRESSURE: 118 MMHG | BODY MASS INDEX: 25.92 KG/M2 | DIASTOLIC BLOOD PRESSURE: 80 MMHG

## 2024-03-27 DIAGNOSIS — I44.7 LBBB (LEFT BUNDLE BRANCH BLOCK): ICD-10-CM

## 2024-03-27 DIAGNOSIS — I42.0 CARDIOMYOPATHY, DILATED: ICD-10-CM

## 2024-03-27 DIAGNOSIS — R06.09 DYSPNEA ON EXERTION: ICD-10-CM

## 2024-03-27 LAB
BH CV REST NUCLEAR ISOTOPE DOSE: 9.9 MCI
BH CV STRESS BP STAGE 2: NORMAL
BH CV STRESS BP STAGE 4: NORMAL
BH CV STRESS COMMENTS STAGE 1: NORMAL
BH CV STRESS DOSE REGADENOSON STAGE 1: 0.4
BH CV STRESS DURATION MIN STAGE 1: 1
BH CV STRESS DURATION MIN STAGE 2: 1
BH CV STRESS DURATION MIN STAGE 3: 1
BH CV STRESS DURATION MIN STAGE 4: 1
BH CV STRESS DURATION SEC STAGE 1: 0
BH CV STRESS DURATION SEC STAGE 2: 0
BH CV STRESS DURATION SEC STAGE 3: 0
BH CV STRESS DURATION SEC STAGE 4: 0
BH CV STRESS HR STAGE 1: 55
BH CV STRESS HR STAGE 2: 65
BH CV STRESS HR STAGE 3: 66
BH CV STRESS HR STAGE 4: 66
BH CV STRESS NUCLEAR ISOTOPE DOSE: 33 MCI
BH CV STRESS O2 STAGE 1: 98
BH CV STRESS O2 STAGE 2: 98
BH CV STRESS O2 STAGE 3: 99
BH CV STRESS O2 STAGE 4: 99
BH CV STRESS PROTOCOL 1: NORMAL
BH CV STRESS RECOVERY BP: NORMAL MMHG
BH CV STRESS RECOVERY HR: 62 BPM
BH CV STRESS RECOVERY O2: 99 %
BH CV STRESS STAGE 1: 1
BH CV STRESS STAGE 2: 2
BH CV STRESS STAGE 3: 3
BH CV STRESS STAGE 4: 4
LV EF NUC BP: 59 %
MAXIMAL PREDICTED HEART RATE: 141 BPM
PERCENT MAX PREDICTED HR: 48.23 %
STRESS BASELINE BP: NORMAL MMHG
STRESS BASELINE HR: 53 BPM
STRESS O2 SAT REST: 99 %
STRESS PERCENT HR: 57 %
STRESS POST ESTIMATED WORKLOAD: 1 METS
STRESS POST EXERCISE DUR MIN: 4 MIN
STRESS POST EXERCISE DUR SEC: 0 SEC
STRESS POST O2 SAT PEAK: 99 %
STRESS POST PEAK BP: NORMAL MMHG
STRESS POST PEAK HR: 68 BPM
STRESS TARGET HR: 120 BPM

## 2024-03-27 PROCEDURE — 78452 HT MUSCLE IMAGE SPECT MULT: CPT

## 2024-03-27 PROCEDURE — 25010000002 REGADENOSON 0.4 MG/5ML SOLUTION: Performed by: INTERNAL MEDICINE

## 2024-03-27 PROCEDURE — 93017 CV STRESS TEST TRACING ONLY: CPT

## 2024-03-27 PROCEDURE — 0 TECHNETIUM SESTAMIBI: Performed by: INTERNAL MEDICINE

## 2024-03-27 PROCEDURE — A9500 TC99M SESTAMIBI: HCPCS | Performed by: INTERNAL MEDICINE

## 2024-03-27 RX ORDER — REGADENOSON 0.08 MG/ML
0.4 INJECTION, SOLUTION INTRAVENOUS ONCE
Status: COMPLETED | OUTPATIENT
Start: 2024-03-27 | End: 2024-03-27

## 2024-03-27 RX ADMIN — TECHNETIUM TC 99M SESTAMIBI 1 DOSE: 1 INJECTION INTRAVENOUS at 07:30

## 2024-03-27 RX ADMIN — REGADENOSON 0.4 MG: 0.08 INJECTION, SOLUTION INTRAVENOUS at 09:01

## 2024-03-27 RX ADMIN — TECHNETIUM TC 99M SESTAMIBI 1 DOSE: 1 INJECTION INTRAVENOUS at 09:05

## 2024-03-28 ENCOUNTER — TELEPHONE (OUTPATIENT)
Dept: CARDIOLOGY | Facility: CLINIC | Age: 79
End: 2024-03-28
Payer: COMMERCIAL

## 2024-03-28 NOTE — TELEPHONE ENCOUNTER
----- Message from Sukhjinder Archuleta III, MD sent at 3/28/2024  2:00 PM EDT -----  Stress test revealed evidence of an old heart attack but no new issues that need to be addressed.  Continue current medical therapy.

## 2024-04-01 ENCOUNTER — TELEPHONE (OUTPATIENT)
Dept: UROLOGY | Facility: CLINIC | Age: 79
End: 2024-04-01

## 2024-04-01 ENCOUNTER — TELEPHONE (OUTPATIENT)
Dept: FAMILY MEDICINE CLINIC | Facility: CLINIC | Age: 79
End: 2024-04-01
Payer: COMMERCIAL

## 2024-04-01 ENCOUNTER — OFFICE VISIT (OUTPATIENT)
Dept: UROLOGY | Facility: CLINIC | Age: 79
End: 2024-04-01
Payer: COMMERCIAL

## 2024-04-01 ENCOUNTER — TELEPHONE (OUTPATIENT)
Dept: CARDIOLOGY | Facility: CLINIC | Age: 79
End: 2024-04-01
Payer: COMMERCIAL

## 2024-04-01 VITALS
BODY MASS INDEX: 25.77 KG/M2 | SYSTOLIC BLOOD PRESSURE: 122 MMHG | HEIGHT: 69 IN | OXYGEN SATURATION: 97 % | WEIGHT: 174 LBS | DIASTOLIC BLOOD PRESSURE: 74 MMHG | HEART RATE: 66 BPM

## 2024-04-01 DIAGNOSIS — Z91.89 AT RISK FOR CHANGE OF VISION: Primary | ICD-10-CM

## 2024-04-01 DIAGNOSIS — N20.0 RIGHT KIDNEY STONE: Primary | ICD-10-CM

## 2024-04-01 DIAGNOSIS — N20.0 STAGHORN CALCULUS: ICD-10-CM

## 2024-04-01 LAB
BILIRUB UR QL STRIP: NEGATIVE
CLARITY UR: ABNORMAL
COLOR UR: YELLOW
GLUCOSE UR STRIP-MCNC: NEGATIVE MG/DL
HGB UR QL STRIP.AUTO: ABNORMAL
KETONES UR QL STRIP: NEGATIVE
LEUKOCYTE ESTERASE UR QL STRIP.AUTO: ABNORMAL
NITRITE UR QL STRIP: NEGATIVE
PH UR STRIP.AUTO: 7 [PH] (ref 5–8)
PROT UR QL STRIP: ABNORMAL
SP GR UR STRIP: 1.02 (ref 1–1.03)
UROBILINOGEN UR QL STRIP: ABNORMAL

## 2024-04-01 PROCEDURE — 3078F DIAST BP <80 MM HG: CPT | Performed by: STUDENT IN AN ORGANIZED HEALTH CARE EDUCATION/TRAINING PROGRAM

## 2024-04-01 PROCEDURE — 81003 URINALYSIS AUTO W/O SCOPE: CPT | Performed by: STUDENT IN AN ORGANIZED HEALTH CARE EDUCATION/TRAINING PROGRAM

## 2024-04-01 PROCEDURE — 3074F SYST BP LT 130 MM HG: CPT | Performed by: STUDENT IN AN ORGANIZED HEALTH CARE EDUCATION/TRAINING PROGRAM

## 2024-04-01 PROCEDURE — 99205 OFFICE O/P NEW HI 60 MIN: CPT | Performed by: STUDENT IN AN ORGANIZED HEALTH CARE EDUCATION/TRAINING PROGRAM

## 2024-04-01 PROCEDURE — 87086 URINE CULTURE/COLONY COUNT: CPT | Performed by: STUDENT IN AN ORGANIZED HEALTH CARE EDUCATION/TRAINING PROGRAM

## 2024-04-01 NOTE — TELEPHONE ENCOUNTER
[facility] called and needs a referral for a office visit and OCT scanning.   The CPT codes are 98951 for office visit  And 66214 for OCT scanning.  Fax to [facility] billing department [phone]

## 2024-04-01 NOTE — TELEPHONE ENCOUNTER
Provider: DR. MARTÍNEZ     Caller: Rufino Gannon    Relationship to patient: Self    Best call back number: [phone]    Chief complaint: Right kidney stone     Type of visit: SURGERY    Requested date: TBD     If rescheduling, when is the original appointment: N/A     Additional notes: MR. GANNON CALLED TO SPEAK TO JT REGARDING SURGERY SCHEDULING.       ---UNABLE TO WARM TRANSFER

## 2024-04-01 NOTE — TELEPHONE ENCOUNTER
Nicole JOHNSON at 's office is requesting a Cardiac Risk Assessment be placed in [facility]. He is scheduled for a Rt Nephrostolithotomy Percutaneous and Cystoscopy,Rt urethral stent Placement on 5/17/2024. Also asking to hold Aspirin 5-7 days prior to surgery. Please advise.

## 2024-04-01 NOTE — PROGRESS NOTES
"      Male Office Visit      Patient Name: Rufino Ulloa  : 1945   MRN: [id number]     Chief Complaint:  Microscopic Hematuria.   Chief Complaint   Patient presents with    microscopic hematuria       Referring Provider: Maritza Lewis DO    History of Present Illness: Mr. Ulloa is a 78 y.o. male with history of right nephrolithiasis presents to [facility] care.  Patient is referred to me by PCP for microscopic materia.  CT imaging was performed 2024 demonstrating a large 2 cm right renal pelvis stone.  The patient has a history of CAD status post 7 coronary stent placed.  Manages with aspirin 81.  Placed on Flomax by his PCP given stone on CT.  Denies gross hematuria.  He states he has had a kidney stone 50 years ago which he passed which was \"excruciating\".  He has never had previous urologic surgery.    Lab Results   Component Value Date    PSA 2.340 2024     PSA is normal.        CT imaging demonstrates large renal pelvis stone      Underwent a recent cardiac stress test:     Study Impression Impressions are consistent with an intermediate risk study.   Ventricle Size / Description Left ventricular ejection fraction is normal (Calculated EF = 59%). Normal LV cavity size. Abnormal LV wall motion consistent with moderate hypokinesis of the inferior wall. RV cavity size not well visualized.          Subjective      Review of System: Review of Systems   Genitourinary: Negative.       I have reviewed the ROS documented by my clinical staff,  I have updated appropriately and I agree. Bobo Pond MD    Past Medical History:  Past Medical History:   Diagnosis Date    Asthma     COPD (chronic obstructive pulmonary disease) Not COPD, but initial stage of emphysema    Coronary artery disease     Glaucoma     History of heart attack     pt states he had a mild heart attack    Hyperlipemia     Hypertension     Myocardial infarction About     Pneumonia        Past Surgical " History:  Past Surgical History:   Procedure Laterality Date    CORONARY STENT PLACEMENT      7 stents from 4913-2546       Medications:    Current Outpatient Medications:     aspirin 81 MG EC tablet, Take 1 tablet by mouth Daily., Disp: , Rfl:     brimonidine-timolol (COMBIGAN) 0.2-0.5 % ophthalmic solution, Administer 1 drop to both eyes 2 (Two) Times a Day., Disp: , Rfl:     Cholecalciferol (Vitamin D3) 50 MCG ( UT) capsule, Take 1 capsule by mouth Daily., Disp: , Rfl:     losartan-hydrochlorothiazide (HYZAAR) 50-12.5 MG per tablet, Take 1 tablet by mouth Daily., Disp: 90 tablet, Rfl: 3    Lutein 40 MG capsule, Take 40 mg by mouth Daily., Disp: , Rfl:     metoprolol succinate XL (TOPROL-XL) 25 MG 24 hr tablet, Take 1 tablet by mouth every night at bedtime., Disp: 90 tablet, Rfl: 3    multivitamin with minerals (Centrum Men) tablet tablet, Take 1 tablet by mouth Daily., Disp: , Rfl:     potassium chloride (MICRO-K) 10 MEQ CR capsule, Take 1 capsule by mouth 2 (Two) Times a Day., Disp: 90 capsule, Rfl: 1    rosuvastatin (CRESTOR) 40 MG tablet, Take 1 tablet by mouth Daily., Disp: 90 tablet, Rfl: 3    vitamin B-12 (CYANOCOBALAMIN) 1000 MCG tablet, Take 1 tablet by mouth Daily., Disp: , Rfl:     tamsulosin (FLOMAX) 0.4 MG capsule 24 hr capsule, Take 1 capsule by mouth Daily. (Patient not taking: Reported on 2024), Disp: 30 capsule, Rfl: 0    Allergies:  No Known Allergies    Social History:  Social History     Socioeconomic History    Marital status: Single   Tobacco Use    Smoking status: Former     Current packs/day: 0.00     Average packs/day: 0.7 packs/day for 49.5 years (33.0 ttl pk-yrs)     Types: Cigarettes     Start date: 1960     Quit date: 1998     Years since quittin.2     Passive exposure: Past    Smokeless tobacco: Never    Tobacco comments:     It took me five years to quit smoking.   Vaping Use    Vaping status: Never Used   Substance and Sexual Activity    Alcohol use: Not  "Currently     Comment: I will drink on occasion, but not frequently    Drug use: Never    Sexual activity: Not Currently     Partners: Female       Family History:  Family History   Problem Relation Age of Onset    Colon cancer Mother     No Known Problems Sister     HIV Brother        IPSS Questionnaire (AUA-7):  Over the past month…    1)  Incomplete Emptying:       How often have you had a sensation of not emptying you had the sensation of not emptying your bladder completely after you finished urinating?  1 - Less than 1 time in 5   2)  Frequency:       How often have you had the urinate again less than two hours after you finished urinating?  2 - Less than half the time   3)  Intermittency:       How often have you found you stopped and started again several times when you urinated?   3 - About half the time   4) Urgency:      How often have you found it difficult to postpone urination?  1 - Less than 1 time in 5   5) Weak Stream:      How often have you had a weak urinary stream?  1 - Less than 1 time in 5   6) Straining:       How often have you had to push or strain to begin urination?  0 - Not at all   7) Nocturia:      How many times did you most typically get up to urinate from the time you went to bed at night until the time you got up in the morning?  3 - 3 times   Total Score:  10   The International Prostate Symptom Score (IPSS) is used to screen, diagnose, track symptoms of benign prostatic hyperplasia (BPH).   0-7 (Mild Symptoms) 8-19 (Moderate) 20-35 (Severe)   Quality of Life (QoL):  If you were to spend the rest of your life with your urinary condition just the way it is now, how would you feel about that? 3-Mixed   Urine Leakage (Incontinence) 0-No Leakage         Objective     Physical Exam:   Vital Signs:   Vitals:    04/01/24 0810   BP: 122/74   Pulse: 66   SpO2: 97%   Weight: 78.9 kg (174 lb)   Height: 175.3 cm (69\")     Body mass index is 25.7 kg/m².     Physical Exam  Constitutional:       " Appearance: Normal appearance.   HENT:      Head: Normocephalic and atraumatic.      Nose: Nose normal.   Eyes:      Extraocular Movements: Extraocular movements intact.      Conjunctiva/sclera: Conjunctivae normal.      Pupils: Pupils are equal, round, and reactive to light.   Musculoskeletal:         General: Normal range of motion.      Cervical back: Normal range of motion and neck supple.   Skin:     General: Skin is warm and dry.      Findings: No lesion or rash.   Neurological:      General: No focal deficit present.      Mental Status: He is alert and oriented to person, place, and time. Mental status is at baseline.   Psychiatric:         Mood and Affect: Mood normal.         Behavior: Behavior normal.         Labs:   Brief Urine Lab Results  (Last result in the past 365 days)        Color   Clarity   Blood   Leuk Est   Nitrite   Protein   CREAT   Urine HCG        03/11/24 0957 Yellow   Cloudy   Moderate (2+)   Negative   Negative   100 mg/dL (2+)                        Lab Results   Component Value Date    GLUCOSE 80 03/11/2024    CALCIUM 9.7 03/11/2024     03/11/2024    K 4.2 03/11/2024    CO2 28.0 03/11/2024     03/11/2024    BUN 23 03/11/2024    CREATININE 1.08 03/11/2024    BCR 21.3 03/11/2024    ANIONGAP 9.0 03/11/2024       Lab Results   Component Value Date    WBC 6.13 01/09/2024    HGB 15.3 01/09/2024    HCT 45.7 01/09/2024    MCV 88.7 01/09/2024     01/09/2024       Images:   CT Abdomen Pelvis Stone Protocol    Result Date: 3/26/2024  Impression: 1.No acute abnormality identified within the abdomen or pelvis. 2.Bilateral nonobstructive nephrolithiasis, including a 20 x 15 m nonobstructing calculus in the right renal pelvis. There is stranding about the right renal pelvis. 3.Colonic diverticulosis. 4.Additional findings as detailed above. Electronically Signed: Lee Cardoso MD  3/26/2024 3:24 PM EDT  Workstation ID: [id number]    CT Chest Low Dose Cancer Screening WO    Result  Date: 2/18/2024  Impression: There are couple sub--6 mm micronodules near the right apex. No suspicious pulmonary nodule. Right middle lobe pulmonary lymph node is noted. Bronchial wall thickening and scattered mucous plugging and bronchiectasis in the right lower lobe. Recommendation: Continue annual screening with LDCT Lung Rads Assessment: Lung-RADS L2 - Benign appearance or <1% chance of malignancy. Electronically Signed: Lanre Pond MD  2/18/2024 12:48 PM EST  Workstation ID: [id number]    XR Chest PA & Lateral (In Office)    Result Date: 1/12/2024  Impression: Trace right effusion. Electronically Signed: Jacob Stout MD  1/12/2024 9:34 AM CST  Workstation ID: [id number]      Measures:   Tobacco:   Rufino Ulloa  reports that he quit smoking about 26 years ago. His smoking use included cigarettes. He started smoking about 64 years ago. He has a 33 pack-year smoking history. He has been exposed to tobacco smoke. He has never used smokeless tobacco.     Assessment / Plan      Assessment/Plan:   Mr. Ulloa is a 78 y.o. male who presented today for microscopic hematuria with CT workup demonstrating large 2+ centimeter right renal pelvis stone with no obvious hydronephrosis.  He has additional small nonobstructing stones on the left.  The patient's right-sided stone is large enough that would require percutaneous nephrolithotomy for clearance.  We discussed the relevant risks benefits and alternatives.  Staged ureteroscopy would likely require multiple procedures to clear his stone burden.  I do not recommend ESWL given size of stone.    The relevant risks of percutaneous nephrolithotomy in a patient with CAD on aspirin and potentially reduced ejection heart failure could increase his risk of cardiac complications.  However the benefit of percutaneous nephrolithotomy is that is a more efficient procedure to remove the stone.  He would require admission to the hospital prior.  I would prefer interventional  radiology guided nephroureteral drain placement morning of his procedure and then proceed to right-sided PCNL same day.  He understands the risks and is willing to proceed.    Risks of PCNL include bleeding requiring transfusion, overnight admission the hospital, DVT, PE, MI, CVA, anesthesia related complications or even death.    The patient would have to hold his aspirin 5 days prior to procedure to reduce bleeding complications and we will have to reach out to his cardiologist to determine if this is safe.    Patient wants to delay procedure until Mid May.     Urine culture today.       Diagnoses and all orders for this visit:    1. Right kidney stone (Primary)  -     Urine Culture - Urine, Urine, Clean Catch  -     Case Request; Standing  -     CBC (No Diff); Future  -     Basic Metabolic Panel; Future  -     Urinalysis without microscopic (no culture) - Urine, Clean Catch; Future  -     Type & Screen; Future  -     ECG 12 Lead; Future  -     Protime-INR; Future  -     aPTT; Future  -     ceFAZolin (ANCEF) 2,000 mg in sodium chloride 0.9 % 100 mL IVPB  -     Case Request  -     Obtain Informed Consent; Standing  -     IR Nephrostomy Tube Placement Right; Future  -     No Lab Testing Needed; Standing    2. Staghorn calculus  -     Case Request; Standing  -     CBC (No Diff); Future  -     Basic Metabolic Panel; Future  -     Urinalysis without microscopic (no culture) - Urine, Clean Catch; Future  -     Type & Screen; Future  -     ECG 12 Lead; Future  -     Protime-INR; Future  -     aPTT; Future  -     ceFAZolin (ANCEF) 2,000 mg in sodium chloride 0.9 % 100 mL IVPB  -     Case Request  -     Obtain Informed Consent; Standing  -     IR Nephrostomy Tube Placement Right; Future  -     No Lab Testing Needed; Standing    Other orders  -     Follow Anesthesia Guidelines / Protocol; Future  -     Provide NPO Instructions to Patient; Future  -     Provide Hydration Instructions to Patient; Future  -     Provide  Chlorhexidine Skin Prep Wipes and Instructions; Future  -     Obtain Informed Consent; Future  -     Nursing to Order Blood Glucose on all Inpatients >18 years Old with not BMP Ordered; Future  -     Nursing to Place Order for HgbA1c on Adult Patients >18 Years Old (HgbA1c Within One Month of Admission Acceptable); Future  -     Nurse to Contact Surgeon for Cardiology Consult if Indicated; Future  -     Nurse to Consult  Anesthesia if Indicated; Future  -     Follow Anesthesia Guidelines / Protocol; Standing  -     Verify NPO Status; Standing  -     Verify the Time Patient Completed Gatorade / G2; Standing           Follow Up:   No follow-ups on file.    I spent approximately 60 minutes providing clinical care for this patient; including review of patient's chart and provider documentation, face to face time spent with patient in examination room (obtaining history, performing physical exam, discussing diagnosis and management options), placing orders, and completing patient documentation.     Bobo Pond MD  [facility]

## 2024-04-03 LAB — BACTERIA SPEC AEROBE CULT: NO GROWTH

## 2024-05-08 ENCOUNTER — PRE-ADMISSION TESTING (OUTPATIENT)
Dept: PREADMISSION TESTING | Facility: HOSPITAL | Age: 79
End: 2024-05-08
Payer: COMMERCIAL

## 2024-05-08 VITALS — HEIGHT: 69 IN | WEIGHT: 173.28 LBS | BODY MASS INDEX: 25.67 KG/M2

## 2024-05-08 DIAGNOSIS — Z01.89 LABORATORY TEST: Primary | ICD-10-CM

## 2024-05-08 DIAGNOSIS — N20.0 RIGHT KIDNEY STONE: ICD-10-CM

## 2024-05-08 DIAGNOSIS — N20.0 STAGHORN CALCULUS: ICD-10-CM

## 2024-05-08 LAB
ANION GAP SERPL CALCULATED.3IONS-SCNC: 10 MMOL/L (ref 5–15)
APTT PPP: 30.6 SECONDS (ref 22–39)
BUN SERPL-MCNC: 18 MG/DL (ref 8–23)
BUN/CREAT SERPL: 17.3 (ref 7–25)
CALCIUM SPEC-SCNC: 9.4 MG/DL (ref 8.6–10.5)
CHLORIDE SERPL-SCNC: 103 MMOL/L (ref 98–107)
CO2 SERPL-SCNC: 29 MMOL/L (ref 22–29)
CREAT SERPL-MCNC: 1.04 MG/DL (ref 0.76–1.27)
DEPRECATED RDW RBC AUTO: 41.8 FL (ref 37–54)
EGFRCR SERPLBLD CKD-EPI 2021: 72.6 ML/MIN/1.73
ERYTHROCYTE [DISTWIDTH] IN BLOOD BY AUTOMATED COUNT: 13.3 % (ref 12.3–15.4)
GLUCOSE SERPL-MCNC: 94 MG/DL (ref 65–99)
HBA1C MFR BLD: 5.4 % (ref 4.8–5.6)
HCT VFR BLD AUTO: 44.7 % (ref 37.5–51)
HGB BLD-MCNC: 14.8 G/DL (ref 13–17.7)
INR PPP: 1.04 (ref 0.89–1.12)
MCH RBC QN AUTO: 28.7 PG (ref 26.6–33)
MCHC RBC AUTO-ENTMCNC: 33.1 G/DL (ref 31.5–35.7)
MCV RBC AUTO: 86.6 FL (ref 79–97)
PLATELET # BLD AUTO: 212 10*3/MM3 (ref 140–450)
PMV BLD AUTO: 9.8 FL (ref 6–12)
POTASSIUM SERPL-SCNC: 3.7 MMOL/L (ref 3.5–5.2)
PROTHROMBIN TIME: 13.7 SECONDS (ref 12.2–14.5)
RBC # BLD AUTO: 5.16 10*6/MM3 (ref 4.14–5.8)
SODIUM SERPL-SCNC: 142 MMOL/L (ref 136–145)
WBC NRBC COR # BLD AUTO: 7.04 10*3/MM3 (ref 3.4–10.8)

## 2024-05-08 PROCEDURE — 86900 BLOOD TYPING SEROLOGIC ABO: CPT

## 2024-05-08 PROCEDURE — 85610 PROTHROMBIN TIME: CPT

## 2024-05-08 PROCEDURE — 36415 COLL VENOUS BLD VENIPUNCTURE: CPT

## 2024-05-08 PROCEDURE — 86901 BLOOD TYPING SEROLOGIC RH(D): CPT

## 2024-05-08 PROCEDURE — 80048 BASIC METABOLIC PNL TOTAL CA: CPT

## 2024-05-08 PROCEDURE — 83036 HEMOGLOBIN GLYCOSYLATED A1C: CPT

## 2024-05-08 PROCEDURE — 85730 THROMBOPLASTIN TIME PARTIAL: CPT

## 2024-05-08 PROCEDURE — 85027 COMPLETE CBC AUTOMATED: CPT

## 2024-05-08 NOTE — PAT
An arrival time for procedure was not provided during PAT visit. If patient had any questions or concerns about their arrival time, they were instructed to contact their surgeon/physician.  Additionally, if the patient referred to an arrival time that was acquired from their my chart account, patient was encouraged to verify that time with their surgeon/physician. Arrival times are NOT provided in Pre Admission Testing Department.    Patient viewed general PAT education video as instructed in their preoperative information received from their surgeon.  Patient stated the general PAT education video was viewed in its entirety and survey completed.  Copies of PAT general education handouts (Incentive Spirometry, Meds to Beds Program, Patient Belongings, Pre-op skin preparation instructions, Blood Glucose testing, Visitor policy, Surgery FAQ, Code H) distributed to patient if not printed. Education related to the PAT pass and skin preparation for surgery (if applicable) completed in PAT as a reinforcement to PAT education video. Patient instructed to return PAT pass provided today as well as completed skin preparation sheet (if applicable) on the day of procedure.     Additionally if patient had not viewed video yet but intended to view it at home or in our waiting area, then referred them to the handout with QR code/link provided during PAT visit.  Instructed patient to complete survey after viewing the video in its entirety.  Encouraged patient/family to read PAT general education handouts thoroughly and notify PAT staff with any questions or concerns. Patient verbalized understanding of all information and priority content.    Patient denies any current skin issues.     Per Anesthesia Request, patient instructed not to take their ACE/ARB medications on the AM of surgery.    Patient instructed to drink 20 ounces of Gatorade or Gatorlyte (if diabetic) and it needs to be completed 1 hour (for Main OR patients) or 2  hours (scheduled  section & [facility] patients) before given arrival time for procedure (NO RED Gatorade and NO Gatorade Zero).    Patient verbalized understanding.    Patient to apply Chlorhexadine wipes  to surgical area (as instructed) the night before procedure and the AM of procedure. Wipes provided.

## 2024-05-09 LAB
ABO GROUP BLD: NORMAL
RH BLD: POSITIVE

## 2024-05-15 ENCOUNTER — PREP FOR SURGERY (OUTPATIENT)
Dept: OTHER | Facility: HOSPITAL | Age: 79
End: 2024-05-15
Payer: COMMERCIAL

## 2024-05-15 ENCOUNTER — TELEPHONE (OUTPATIENT)
Dept: INFUSION THERAPY | Facility: HOSPITAL | Age: 79
End: 2024-05-15
Payer: COMMERCIAL

## 2024-05-15 RX ORDER — SODIUM CHLORIDE 0.9 % (FLUSH) 0.9 %
3 SYRINGE (ML) INJECTION EVERY 12 HOURS SCHEDULED
OUTPATIENT
Start: 2024-05-15

## 2024-05-15 RX ORDER — SODIUM CHLORIDE 9 MG/ML
40 INJECTION, SOLUTION INTRAVENOUS AS NEEDED
OUTPATIENT
Start: 2024-05-15

## 2024-05-15 RX ORDER — SODIUM CHLORIDE 0.9 % (FLUSH) 0.9 %
10 SYRINGE (ML) INJECTION AS NEEDED
OUTPATIENT
Start: 2024-05-15

## 2024-05-15 NOTE — TELEPHONE ENCOUNTER
Pt contacted as pre-procedure phone call prior to planned nephrostomy tube placement followed by a right PCNL for 5/17/24. Reviewed with patient arrival time, location, nothing to eat or drink by mouth after midnight on Thursday, okay to take blood pressure medications morning of procedure with a small sip of water, last dose of ASA was 5/4/24, pt will be admitted after procedure, reviewed procedure instructions and allowed time for questions, and reviewed home medications, allergies, and medical history.

## 2024-05-16 ENCOUNTER — ANESTHESIA EVENT (OUTPATIENT)
Dept: PERIOP | Facility: HOSPITAL | Age: 79
End: 2024-05-16
Payer: COMMERCIAL

## 2024-05-16 RX ORDER — SODIUM CHLORIDE 0.9 % (FLUSH) 0.9 %
10 SYRINGE (ML) INJECTION EVERY 12 HOURS SCHEDULED
Status: CANCELLED | OUTPATIENT
Start: 2024-05-16

## 2024-05-16 RX ORDER — FAMOTIDINE 10 MG/ML
20 INJECTION, SOLUTION INTRAVENOUS ONCE
Status: CANCELLED | OUTPATIENT
Start: 2024-05-16 | End: 2024-05-16

## 2024-05-17 ENCOUNTER — ANESTHESIA EVENT CONVERTED (OUTPATIENT)
Dept: ANESTHESIOLOGY | Facility: HOSPITAL | Age: 79
End: 2024-05-17
Payer: COMMERCIAL

## 2024-05-17 ENCOUNTER — HOSPITAL ENCOUNTER (OUTPATIENT)
Dept: INTERVENTIONAL RADIOLOGY/VASCULAR | Facility: HOSPITAL | Age: 79
Discharge: HOME | End: 2024-05-17
Payer: COMMERCIAL

## 2024-05-17 ENCOUNTER — ANESTHESIA (OUTPATIENT)
Dept: PERIOP | Facility: HOSPITAL | Age: 79
End: 2024-05-17
Payer: COMMERCIAL

## 2024-05-17 ENCOUNTER — APPOINTMENT (OUTPATIENT)
Dept: GENERAL RADIOLOGY | Facility: HOSPITAL | Age: 79
End: 2024-05-17
Payer: COMMERCIAL

## 2024-05-17 ENCOUNTER — HOSPITAL ENCOUNTER (OUTPATIENT)
Facility: HOSPITAL | Age: 79
Setting detail: SURGERY ADMIT
Discharge: HOME | End: 2024-05-17
Attending: STUDENT IN AN ORGANIZED HEALTH CARE EDUCATION/TRAINING PROGRAM | Admitting: STUDENT IN AN ORGANIZED HEALTH CARE EDUCATION/TRAINING PROGRAM
Payer: COMMERCIAL

## 2024-05-17 VITALS
SYSTOLIC BLOOD PRESSURE: 107 MMHG | DIASTOLIC BLOOD PRESSURE: 77 MMHG | BODY MASS INDEX: 25.67 KG/M2 | OXYGEN SATURATION: 98 % | HEIGHT: 69 IN | HEART RATE: 61 BPM | WEIGHT: 173.28 LBS | RESPIRATION RATE: 18 BRPM | TEMPERATURE: 97 F

## 2024-05-17 DIAGNOSIS — N20.0 KIDNEY STONE ON RIGHT SIDE: ICD-10-CM

## 2024-05-17 DIAGNOSIS — N20.0 STAGHORN CALCULUS: ICD-10-CM

## 2024-05-17 DIAGNOSIS — N20.0 RIGHT KIDNEY STONE: ICD-10-CM

## 2024-05-17 LAB
ABO GROUP BLD: NORMAL
BLD GP AB SCN SERPL QL: NEGATIVE
POTASSIUM SERPL-SCNC: 4 MMOL/L (ref 3.5–5.2)
RH BLD: POSITIVE
T&S EXPIRATION DATE: NORMAL

## 2024-05-17 PROCEDURE — 86900 BLOOD TYPING SEROLOGIC ABO: CPT | Performed by: STUDENT IN AN ORGANIZED HEALTH CARE EDUCATION/TRAINING PROGRAM

## 2024-05-17 PROCEDURE — 84132 ASSAY OF SERUM POTASSIUM: CPT | Performed by: ANESTHESIOLOGY

## 2024-05-17 PROCEDURE — 86850 RBC ANTIBODY SCREEN: CPT | Performed by: STUDENT IN AN ORGANIZED HEALTH CARE EDUCATION/TRAINING PROGRAM

## 2024-05-17 PROCEDURE — 25810000003 LACTATED RINGERS PER 1000 ML: Performed by: ANESTHESIOLOGY

## 2024-05-17 PROCEDURE — S0260 H&P FOR SURGERY: HCPCS | Performed by: PHYSICIAN ASSISTANT

## 2024-05-17 PROCEDURE — G0463 HOSPITAL OUTPT CLINIC VISIT: HCPCS | Performed by: STUDENT IN AN ORGANIZED HEALTH CARE EDUCATION/TRAINING PROGRAM

## 2024-05-17 PROCEDURE — 86901 BLOOD TYPING SEROLOGIC RH(D): CPT | Performed by: STUDENT IN AN ORGANIZED HEALTH CARE EDUCATION/TRAINING PROGRAM

## 2024-05-17 RX ORDER — SODIUM CHLORIDE 9 MG/ML
40 INJECTION, SOLUTION INTRAVENOUS AS NEEDED
Status: DISCONTINUED | OUTPATIENT
Start: 2024-05-17 | End: 2024-05-17 | Stop reason: HOSPADM

## 2024-05-17 RX ORDER — FENTANYL CITRATE 50 UG/ML
INJECTION, SOLUTION INTRAMUSCULAR; INTRAVENOUS
Status: DISCONTINUED
Start: 2024-05-17 | End: 2024-05-17 | Stop reason: WASHOUT

## 2024-05-17 RX ORDER — LIDOCAINE HYDROCHLORIDE 10 MG/ML
0.5 INJECTION, SOLUTION EPIDURAL; INFILTRATION; INTRACAUDAL; PERINEURAL ONCE AS NEEDED
Status: COMPLETED | OUTPATIENT
Start: 2024-05-17 | End: 2024-05-17

## 2024-05-17 RX ORDER — IODIXANOL 320 MG/ML
INJECTION, SOLUTION INTRAVASCULAR
Status: DISCONTINUED
Start: 2024-05-17 | End: 2024-05-17 | Stop reason: HOSPADM

## 2024-05-17 RX ORDER — SODIUM CHLORIDE, SODIUM LACTATE, POTASSIUM CHLORIDE, CALCIUM CHLORIDE 600; 310; 30; 20 MG/100ML; MG/100ML; MG/100ML; MG/100ML
9 INJECTION, SOLUTION INTRAVENOUS CONTINUOUS
Status: DISCONTINUED | OUTPATIENT
Start: 2024-05-17 | End: 2024-05-22 | Stop reason: HOSPADM

## 2024-05-17 RX ORDER — LIDOCAINE HYDROCHLORIDE 10 MG/ML
0.5 INJECTION, SOLUTION EPIDURAL; INFILTRATION; INTRACAUDAL; PERINEURAL ONCE AS NEEDED
Status: DISCONTINUED | OUTPATIENT
Start: 2024-05-17 | End: 2024-05-17 | Stop reason: HOSPADM

## 2024-05-17 RX ORDER — MIDAZOLAM HYDROCHLORIDE 1 MG/ML
0.5 INJECTION INTRAMUSCULAR; INTRAVENOUS
Status: DISCONTINUED | OUTPATIENT
Start: 2024-05-17 | End: 2024-05-17 | Stop reason: HOSPADM

## 2024-05-17 RX ORDER — FAMOTIDINE 20 MG/1
20 TABLET, FILM COATED ORAL ONCE
Status: COMPLETED | OUTPATIENT
Start: 2024-05-17 | End: 2024-05-17

## 2024-05-17 RX ORDER — FAMOTIDINE 20 MG/1
20 TABLET, FILM COATED ORAL ONCE
Status: DISCONTINUED | OUTPATIENT
Start: 2024-05-17 | End: 2024-05-17 | Stop reason: HOSPADM

## 2024-05-17 RX ORDER — SODIUM CHLORIDE 0.9 % (FLUSH) 0.9 %
10 SYRINGE (ML) INJECTION AS NEEDED
Status: DISCONTINUED | OUTPATIENT
Start: 2024-05-17 | End: 2024-05-17 | Stop reason: HOSPADM

## 2024-05-17 RX ORDER — LIDOCAINE HYDROCHLORIDE 10 MG/ML
INJECTION, SOLUTION EPIDURAL; INFILTRATION; INTRACAUDAL; PERINEURAL
Status: COMPLETED
Start: 2024-05-17 | End: 2024-05-17

## 2024-05-17 RX ADMIN — FAMOTIDINE 20 MG: 20 TABLET, FILM COATED ORAL at 07:54

## 2024-05-17 RX ADMIN — SODIUM CHLORIDE, POTASSIUM CHLORIDE, SODIUM LACTATE AND CALCIUM CHLORIDE 9 ML/HR: 600; 310; 30; 20 INJECTION, SOLUTION INTRAVENOUS at 07:53

## 2024-05-17 RX ADMIN — LIDOCAINE HYDROCHLORIDE 0.5 ML: 10 INJECTION, SOLUTION EPIDURAL; INFILTRATION; INTRACAUDAL; PERINEURAL at 07:54

## 2024-05-17 NOTE — ANESTHESIA PROCEDURE NOTES
"Peripheral Block      Patient reassessed immediately prior to procedure    Patient location during procedure: OR  Reason for block: at surgeon's request and post-op pain management  Preanesthetic Checklist  Completed: patient identified, IV checked, site marked, risks and benefits discussed, surgical consent, monitors and equipment checked, pre-op evaluation and timeout performed  Prep:  Pt Position: supine  Sterile barriers:cap, gloves, mask and washed/disinfected hands  Prep: ChloraPrep  Patient monitoring: blood pressure monitoring, continuous pulse oximetry and EKG  Procedure    Sedation: yes  Performed under: general  Guidance:ultrasound guided  Images:still images obtained, printed/placed on chart    Laterality:Bilateral  Block Type:TAP  Injection Technique:single-shot  Needle Type:short-bevel and echogenic  Needle Gauge:20 G  Resistance on Injection: none          Medications  Comment:Block Injection:  LA dose divided between Right and Left block        Post Assessment  Injection Assessment: negative aspiration for heme, incremental injection and no paresthesia on injection  Patient Tolerance:comfortable throughout block  Complications:no  Additional Notes    Subcostal TAPs    A high-frequency linear transducer, with sterile cover, was placed sub-xiphoid to identify [place], right and left Rectus Abdominus Muscles (GENNY). The transducer was moved either right or left subcostally to identify the GENNY and the Transverse Abdominus Muscle (GARRISON). The insertion site was prepped in sterile fashion and then localized with 2-5 ml of 1% Lidocaine. Using ultrasound-guidance, a 20-gauge B-Caicedo 4\" Ultraplex 360 non-stimulating echogenic needle was advanced in plane, from medial to lateral, until the tip of the needle was in the fascial plane between the GENNY and GARRISON. 1-3ml of preservative free normal saline was used to hydro-dissect the fascial planes. After the fascial plane was verified, the local anesthetic (LA) was " "injected. The procedure was repeated on the opposite side for bilateral coverage. Aspiration every 5 ml to prevent intravascular injection. Injection was completed with negative aspiration of blood and negative intravascular injection. Injection pressures were normal with minimal resistance. The subcostal approach to the TAP nerve block ideally anesthetizes the intercostal nerves T6-T9.     Mid-Axillary/Lateral TAPs    A high-frequency linear transducer, with sterile cover, was placed in the midaxillary line between the ASIS and costal margin. The External Oblique Muscle (EOM), Internal Oblique Muscle (IOM), Transverse Abdominus Muscle (GARRISON), and Peritoneum were identified. The insertion site was prepped in sterile fashion and then localized with 2-5 ml of 1% Lidocaine. Using ultrasound-guidance, a 20-gauge B-Caicedo 4\" Ultraplex 360 non-stimulating echogenic needle was advanced in plane, from medial to lateral, until the tip of the needle was in the fascial plane between the IOM and GARRISON. 1-3ml of preservative free normal saline was used to hydro-dissect the fascial planes. After the fascial plane was verified, the local anesthetic (LA) was injected. The procedure was repeated on the opposite side for bilateral coverage. Aspiration every 5 ml to prevent intravascular injection. Injection was completed with negative aspiration of blood and negative intravascular injection. Injection pressures were normal with minimal resistance. Midaxillary TAPs should reach intercostal nerves T10- T11 and the subcostal nerve T12.              "

## 2024-05-17 NOTE — ANESTHESIA PREPROCEDURE EVALUATION
Anesthesia Evaluation                  Airway   Mallampati: I  TM distance: >3 FB  Neck ROM: full  No difficulty expected  Dental      Pulmonary    (+) COPD, asthma,  Cardiovascular     ECG reviewed    (+) hypertension, past MI , CAD, CABG    ROS comment: Large infarct in past without evidence of ongoing ischemia, ef 36-40%    Neuro/Psych  GI/Hepatic/Renal/Endo    (+) renal disease-    Musculoskeletal     Abdominal    Substance History      OB/GYN          Other                    Anesthesia Plan    ASA 4     general     intravenous induction     Anesthetic plan, risks, benefits, and alternatives have been provided, discussed and informed consent has been obtained with: patient.    Plan discussed with CRNA.    CODE STATUS:

## 2024-05-17 NOTE — H&P
[facility] PREOPERATIVE HISTORY AND PHYSICAL       Chief complaint:  KIDNEY STONES    Subjective:    Patient is a 79 y.o.male presents with history of microscopic hematuria with CT workup demonstrating large 2+ centimeter right renal pelvis stone with no obvious hydronephrosis.  He has additional small nonobstructing stones on the left.  History of kidney stones for years.  See office note dated 4/1/24.  The patient is here today for scheduled/consented RIGHT NEPHROSTOLITHOTOMY PERCUTANEOUS AND CYSTOSCOPY WITH RENAL ACCESS , RIGHT URETERAL STENT PLACEMENT.      Associated Documents:   Office Visit with Bobo Pond MD (04/01/2024)     Review of Systems:  General ROS: negative for fever, chills, weakness, dizziness, headache, fatigue, weight changes  Cardiovascular ROS: no chest pain or dyspnea on exertion  +cardiac clearance  Respiratory ROS: no cough, shortness of breath, or wheezing  GI ROS: no abdominal pain/discomfort, nausea, vomiting or diarrhea   ROS: no dysuria, hematuria or complaints  Skin ROS: no itching, rash or open wounds.        Allergies: No Known Allergies  Latex: no known allergy  Contrast Dye:  no known allergy      Home Meds    Medications Prior to Admission   Medication Sig Dispense Refill Last Dose    aspirin 81 MG EC tablet Take 1 tablet by mouth Daily.       brimonidine-timolol (COMBIGAN) 0.2-0.5 % ophthalmic solution Administer 1 drop to both eyes 2 (Two) Times a Day.       Cholecalciferol (Vitamin D3) 50 MCG (2000 UT) capsule Take 1 capsule by mouth Every Night.       losartan-hydrochlorothiazide (HYZAAR) 50-12.5 MG per tablet Take 1 tablet by mouth Daily. (Patient taking differently: Take 1 tablet by mouth Every Night.) 90 tablet 3     Lutein 40 MG capsule Take 40 mg by mouth Every Night.       metoprolol succinate XL (TOPROL-XL) 25 MG 24 hr tablet Take 1 tablet by mouth every night at bedtime. 90 tablet 3     multivitamin with minerals (Centrum Men) tablet tablet  "Take 1 tablet by mouth Every Night.       potassium chloride (MICRO-K) 10 MEQ CR capsule Take 1 capsule by mouth 2 (Two) Times a Day. 90 capsule 1     rosuvastatin (CRESTOR) 40 MG tablet Take 1 tablet by mouth Daily. (Patient taking differently: Take 1 tablet by mouth Every Night.) 90 tablet 3     tamsulosin (FLOMAX) 0.4 MG capsule 24 hr capsule Take 1 capsule by mouth Daily. (Patient not taking: Reported on 2024) 30 capsule 0     vitamin B-12 (CYANOCOBALAMIN) 1000 MCG tablet Take 1 tablet by mouth Daily.        PMH:   Past Medical History:   Diagnosis Date    Asthma     COPD (chronic obstructive pulmonary disease) Not COPD, but initial stage of emphysema    Coronary artery disease     Glaucoma     History of heart attack     pt states he had a mild heart attack    Hyperlipemia     Hypertension     Myocardial infarction About     Pneumonia      PSH:    Past Surgical History:   Procedure Laterality Date    COLONOSCOPY      CORONARY STENT PLACEMENT      7 stents from [id number]    TONSILLECTOMY       Immunization History:   Immunization History   Administered Date(s) Administered    COVID-19 F23 (MODERNA) 12YRS+ (SPIKEVAX) 2023    Pneumococcal Conjugate 20-Valent (PCV20) 2024         Social History:  Social History     Tobacco Use    Smoking status: Former     Current packs/day: 0.00     Average packs/day: 0.7 packs/day for 49.5 years (33.0 ttl pk-yrs)     Types: Cigarettes     Start date: 1960     Quit date: 1998     Years since quittin.3     Passive exposure: Past    Smokeless tobacco: Never    Tobacco comments:     It took me five years to quit smoking.   Substance Use Topics    Alcohol use: Not Currently     Comment: I will drink on occasion, but not frequently           Physical Exam:/77 (BP Location: Right arm, Patient Position: Lying)   Pulse 61   Temp 97 °F (36.1 °C) (Temporal)   Resp 18   Ht 175.3 cm (69.02\")   Wt 78.6 kg (173 lb 4.5 oz)   SpO2 98%   BMI 25.58 " kg/m²       General Appearance:    Alert, cooperative, no distress, appears stated age   Head:    Normocephalic, without obvious abnormality, atraumatic   Lungs:     Clear to auscultation bilaterally, respirations unlabored    Heart: Regular rate and rhythm, S1 and S2 normal, no murmur, rub    or gallop    Abdomen:    Soft without tenderness  +bowel sounds   Breast Exam:    deferred   Genitalia:    deferred   Extremities:   Extremities normal, atraumatic, no cyanosis or edema   Skin:   Skin color, texture, turgor normal, no rashes or lesions   Neurologic:   Grossly intact     Results Review:   LABS:  Lab Results   Component Value Date    WBC 7.04 05/08/2024    HGB 14.8 05/08/2024    HCT 44.7 05/08/2024    MCV 86.6 05/08/2024     05/08/2024    GLUCOSE 94 05/08/2024    BUN 18 05/08/2024    CREATININE 1.04 05/08/2024     05/08/2024    K 3.7 05/08/2024     05/08/2024    CO2 29.0 05/08/2024    CALCIUM 9.4 05/08/2024    ALBUMIN 4.4 03/11/2024    AST 19 03/11/2024    ALT 18 03/11/2024    BILITOT 0.6 03/11/2024       RADIOLOGY:  Imaging Results (Last 72 Hours)       ** No results found for the last 72 hours. **              Cancer Staging (if applicable):  Cancer Patient: __ yes __no __unknown; If yes, clinical stage T:__ N:__M:__, stage group    Impression:  LARGE RIGHT KIDNEY STONE, MICROSCOPIC HEMATURIA    Plan:   RIGHT NEPHROSTOLITHOTOMY PERCUTANEOUS AND CYSTOSCOPY WITH RENAL ACCESS , RIGHT URETERAL STENT PLACEMENT        CATALINA Gandhi 5/17/2024 07:31 EDT

## 2024-05-17 NOTE — PRE-PROCEDURE NOTE
IR NOTE:    Unfortunately the patient had a banana at 6 AM today.  Renal stone access can not be done with local anesthesia or fentanyl only. Besides the risk of aspiration with a non-NPO patient administered opioids in a prone position is present with serious consequences.  Spoke with Dr. NAVARRETE Will have to reschedule.      Nishant Walsh MD  Interventional Radiology  Diagnostic Radiology  [address], [place] [address]  Cell: [phone]  Email: [email or web address]    08:44 EDT

## 2024-05-17 NOTE — NURSING NOTE
Procedure not performed due to eating a banana today. Patient doesn't have transportation home. Transportation (Lyft) arranged for through . Pt left ambulatory. Verbalized instructions to go to front of hospital to ride home through Lyft.

## 2024-05-20 ENCOUNTER — PREP FOR SURGERY (OUTPATIENT)
Dept: OTHER | Facility: HOSPITAL | Age: 79
End: 2024-05-20
Payer: COMMERCIAL

## 2024-05-20 DIAGNOSIS — N20.0 RIGHT NEPHROLITHIASIS: Primary | ICD-10-CM

## 2024-06-12 ENCOUNTER — PREP FOR SURGERY (OUTPATIENT)
Dept: OTHER | Facility: HOSPITAL | Age: 79
End: 2024-06-12
Payer: COMMERCIAL

## 2024-06-12 ENCOUNTER — TELEPHONE (OUTPATIENT)
Dept: INFUSION THERAPY | Facility: HOSPITAL | Age: 79
End: 2024-06-12
Payer: COMMERCIAL

## 2024-06-12 NOTE — TELEPHONE ENCOUNTER
Pt returned message to outpatient prep and recovery prior to planned bilateral neph tube placement for 6/14/24. Reviewed with patient arrival  to main registration by 7 am nothing, to eat or drink by mouth after midnight, okay to take blood pressure medications morning of procedure with a small sip of water, pt reports last dose of Aspirin was 6/7/24,  needed, reviewed procedure instructions and allowed time for questions, and reviewed home medications, allergies, and medical history.

## 2024-06-14 ENCOUNTER — ANESTHESIA EVENT (OUTPATIENT)
Dept: PERIOP | Facility: HOSPITAL | Age: 79
End: 2024-06-14
Payer: COMMERCIAL

## 2024-06-14 ENCOUNTER — ANESTHESIA (OUTPATIENT)
Dept: PERIOP | Facility: HOSPITAL | Age: 79
End: 2024-06-14
Payer: COMMERCIAL

## 2024-06-14 ENCOUNTER — APPOINTMENT (OUTPATIENT)
Dept: GENERAL RADIOLOGY | Facility: HOSPITAL | Age: 79
End: 2024-06-14
Payer: COMMERCIAL

## 2024-06-14 ENCOUNTER — HOSPITAL ENCOUNTER (OUTPATIENT)
Dept: INTERVENTIONAL RADIOLOGY/VASCULAR | Facility: HOSPITAL | Age: 79
Discharge: HOME | End: 2024-06-14
Payer: COMMERCIAL

## 2024-06-14 ENCOUNTER — HOSPITAL ENCOUNTER (OUTPATIENT)
Facility: HOSPITAL | Age: 79
Discharge: HOME | End: 2024-06-15
Attending: STUDENT IN AN ORGANIZED HEALTH CARE EDUCATION/TRAINING PROGRAM | Admitting: STUDENT IN AN ORGANIZED HEALTH CARE EDUCATION/TRAINING PROGRAM
Payer: COMMERCIAL

## 2024-06-14 VITALS
DIASTOLIC BLOOD PRESSURE: 90 MMHG | RESPIRATION RATE: 18 BRPM | SYSTOLIC BLOOD PRESSURE: 159 MMHG | OXYGEN SATURATION: 97 % | HEART RATE: 65 BPM | TEMPERATURE: 97.8 F

## 2024-06-14 DIAGNOSIS — N20.0 RIGHT KIDNEY STONE: ICD-10-CM

## 2024-06-14 DIAGNOSIS — N20.0 RIGHT KIDNEY STONE: Primary | ICD-10-CM

## 2024-06-14 DIAGNOSIS — N20.0 RIGHT NEPHROLITHIASIS: ICD-10-CM

## 2024-06-14 DIAGNOSIS — N20.0 STAGHORN CALCULUS: ICD-10-CM

## 2024-06-14 LAB
ALBUMIN SERPL-MCNC: 4.1 G/DL (ref 3.5–5.2)
ALBUMIN/GLOB SERPL: 1.6 G/DL
ALP SERPL-CCNC: 58 U/L (ref 39–117)
ALT SERPL W P-5'-P-CCNC: 23 U/L (ref 1–41)
ANION GAP SERPL CALCULATED.3IONS-SCNC: 11 MMOL/L (ref 5–15)
ANION GAP SERPL CALCULATED.3IONS-SCNC: 13 MMOL/L (ref 5–15)
AST SERPL-CCNC: 24 U/L (ref 1–40)
BASOPHILS # BLD AUTO: 0.03 10*3/MM3 (ref 0–0.2)
BASOPHILS NFR BLD AUTO: 0.5 % (ref 0–1.5)
BILIRUB SERPL-MCNC: 0.5 MG/DL (ref 0–1.2)
BUN SERPL-MCNC: 15 MG/DL (ref 8–23)
BUN SERPL-MCNC: 16 MG/DL (ref 8–23)
BUN/CREAT SERPL: 15.2 (ref 7–25)
BUN/CREAT SERPL: 16.3 (ref 7–25)
CALCIUM SPEC-SCNC: 9.2 MG/DL (ref 8.6–10.5)
CALCIUM SPEC-SCNC: 9.2 MG/DL (ref 8.6–10.5)
CHLORIDE SERPL-SCNC: 102 MMOL/L (ref 98–107)
CHLORIDE SERPL-SCNC: 103 MMOL/L (ref 98–107)
CO2 SERPL-SCNC: 27 MMOL/L (ref 22–29)
CO2 SERPL-SCNC: 27 MMOL/L (ref 22–29)
CREAT SERPL-MCNC: 0.98 MG/DL (ref 0.76–1.27)
CREAT SERPL-MCNC: 0.99 MG/DL (ref 0.76–1.27)
DEPRECATED RDW RBC AUTO: 41.6 FL (ref 37–54)
DEPRECATED RDW RBC AUTO: 43.5 FL (ref 37–54)
EGFRCR SERPLBLD CKD-EPI 2021: 77 ML/MIN/1.73
EGFRCR SERPLBLD CKD-EPI 2021: 78 ML/MIN/1.73
EOSINOPHIL # BLD AUTO: 0.19 10*3/MM3 (ref 0–0.4)
EOSINOPHIL NFR BLD AUTO: 3 % (ref 0.3–6.2)
ERYTHROCYTE [DISTWIDTH] IN BLOOD BY AUTOMATED COUNT: 13.2 % (ref 12.3–15.4)
ERYTHROCYTE [DISTWIDTH] IN BLOOD BY AUTOMATED COUNT: 13.2 % (ref 12.3–15.4)
GLOBULIN UR ELPH-MCNC: 2.5 GM/DL
GLUCOSE SERPL-MCNC: 102 MG/DL (ref 65–99)
GLUCOSE SERPL-MCNC: 98 MG/DL (ref 65–99)
HCT VFR BLD AUTO: 44 % (ref 37.5–51)
HCT VFR BLD AUTO: 49.3 % (ref 37.5–51)
HGB BLD-MCNC: 14.8 G/DL (ref 13–17.7)
HGB BLD-MCNC: 16.1 G/DL (ref 13–17.7)
IMM GRANULOCYTES # BLD AUTO: 0.01 10*3/MM3 (ref 0–0.05)
IMM GRANULOCYTES NFR BLD AUTO: 0.2 % (ref 0–0.5)
INR PPP: 0.99 (ref 0.89–1.12)
LYMPHOCYTES # BLD AUTO: 1.75 10*3/MM3 (ref 0.7–3.1)
LYMPHOCYTES NFR BLD AUTO: 27.3 % (ref 19.6–45.3)
MCH RBC QN AUTO: 29.1 PG (ref 26.6–33)
MCH RBC QN AUTO: 29.2 PG (ref 26.6–33)
MCHC RBC AUTO-ENTMCNC: 32.7 G/DL (ref 31.5–35.7)
MCHC RBC AUTO-ENTMCNC: 33.6 G/DL (ref 31.5–35.7)
MCV RBC AUTO: 86.6 FL (ref 79–97)
MCV RBC AUTO: 89.5 FL (ref 79–97)
MONOCYTES # BLD AUTO: 0.87 10*3/MM3 (ref 0.1–0.9)
MONOCYTES NFR BLD AUTO: 13.6 % (ref 5–12)
NEUTROPHILS NFR BLD AUTO: 3.55 10*3/MM3 (ref 1.7–7)
NEUTROPHILS NFR BLD AUTO: 55.4 % (ref 42.7–76)
NRBC BLD AUTO-RTO: 0 /100 WBC (ref 0–0.2)
PLATELET # BLD AUTO: 221 10*3/MM3 (ref 140–450)
PLATELET # BLD AUTO: 229 10*3/MM3 (ref 140–450)
PMV BLD AUTO: 10.3 FL (ref 6–12)
PMV BLD AUTO: 10.4 FL (ref 6–12)
POTASSIUM SERPL-SCNC: 4 MMOL/L (ref 3.5–5.2)
POTASSIUM SERPL-SCNC: 4.2 MMOL/L (ref 3.5–5.2)
PROT SERPL-MCNC: 6.6 G/DL (ref 6–8.5)
PROTHROMBIN TIME: 13.2 SECONDS (ref 12.2–14.5)
QT INTERVAL: 434 MS
QTC INTERVAL: 447 MS
RBC # BLD AUTO: 5.08 10*6/MM3 (ref 4.14–5.8)
RBC # BLD AUTO: 5.51 10*6/MM3 (ref 4.14–5.8)
SODIUM SERPL-SCNC: 141 MMOL/L (ref 136–145)
SODIUM SERPL-SCNC: 142 MMOL/L (ref 136–145)
WBC NRBC COR # BLD AUTO: 12.12 10*3/MM3 (ref 3.4–10.8)
WBC NRBC COR # BLD AUTO: 6.4 10*3/MM3 (ref 3.4–10.8)

## 2024-06-14 PROCEDURE — C1726 CATH, BAL DIL, NON-VASCULAR: HCPCS | Performed by: STUDENT IN AN ORGANIZED HEALTH CARE EDUCATION/TRAINING PROGRAM

## 2024-06-14 PROCEDURE — C1887 CATHETER, GUIDING: HCPCS

## 2024-06-14 PROCEDURE — 85025 COMPLETE CBC W/AUTO DIFF WBC: CPT | Performed by: NURSE PRACTITIONER

## 2024-06-14 PROCEDURE — 63710000001 LOSARTAN 50 MG TABLET: Performed by: STUDENT IN AN ORGANIZED HEALTH CARE EDUCATION/TRAINING PROGRAM

## 2024-06-14 PROCEDURE — C1894 INTRO/SHEATH, NON-LASER: HCPCS | Performed by: STUDENT IN AN ORGANIZED HEALTH CARE EDUCATION/TRAINING PROGRAM

## 2024-06-14 PROCEDURE — 85027 COMPLETE CBC AUTOMATED: CPT | Performed by: STUDENT IN AN ORGANIZED HEALTH CARE EDUCATION/TRAINING PROGRAM

## 2024-06-14 PROCEDURE — C1769 GUIDE WIRE: HCPCS

## 2024-06-14 PROCEDURE — 25010000002 FENTANYL CITRATE (PF) 50 MCG/ML SOLUTION: Performed by: RADIOLOGY

## 2024-06-14 PROCEDURE — 25010000002 HYDRALAZINE PER 20 MG: Performed by: NURSE ANESTHETIST, CERTIFIED REGISTERED

## 2024-06-14 PROCEDURE — 25010000002 MIDAZOLAM PER 1 MG: Performed by: RADIOLOGY

## 2024-06-14 PROCEDURE — A9270 NON-COVERED ITEM OR SERVICE: HCPCS | Performed by: STUDENT IN AN ORGANIZED HEALTH CARE EDUCATION/TRAINING PROGRAM

## 2024-06-14 PROCEDURE — 63710000001 HYDROCHLOROTHIAZIDE 25 MG TABLET: Performed by: STUDENT IN AN ORGANIZED HEALTH CARE EDUCATION/TRAINING PROGRAM

## 2024-06-14 PROCEDURE — 93005 ELECTROCARDIOGRAM TRACING: CPT | Performed by: ANESTHESIOLOGY

## 2024-06-14 PROCEDURE — 25810000003 LACTATED RINGERS PER 1000 ML: Performed by: ANESTHESIOLOGY

## 2024-06-14 PROCEDURE — C2617 STENT, NON-COR, TEM W/O DEL: HCPCS | Performed by: STUDENT IN AN ORGANIZED HEALTH CARE EDUCATION/TRAINING PROGRAM

## 2024-06-14 PROCEDURE — 25010000002 ONDANSETRON PER 1 MG: Performed by: NURSE ANESTHETIST, CERTIFIED REGISTERED

## 2024-06-14 PROCEDURE — 25010000002 PROPOFOL 10 MG/ML EMULSION: Performed by: NURSE ANESTHETIST, CERTIFIED REGISTERED

## 2024-06-14 PROCEDURE — 25010000002 CEFTRIAXONE PER 250 MG: Performed by: RADIOLOGY

## 2024-06-14 PROCEDURE — 25010000002 FENTANYL CITRATE (PF) 100 MCG/2ML SOLUTION: Performed by: NURSE ANESTHETIST, CERTIFIED REGISTERED

## 2024-06-14 PROCEDURE — 63710000001 POLYETHYLENE GLYCOL 17 G PACK: Performed by: STUDENT IN AN ORGANIZED HEALTH CARE EDUCATION/TRAINING PROGRAM

## 2024-06-14 PROCEDURE — C1769 GUIDE WIRE: HCPCS | Performed by: STUDENT IN AN ORGANIZED HEALTH CARE EDUCATION/TRAINING PROGRAM

## 2024-06-14 PROCEDURE — C1758 CATHETER, URETERAL: HCPCS | Performed by: STUDENT IN AN ORGANIZED HEALTH CARE EDUCATION/TRAINING PROGRAM

## 2024-06-14 PROCEDURE — 82365 CALCULUS SPECTROSCOPY: CPT | Performed by: STUDENT IN AN ORGANIZED HEALTH CARE EDUCATION/TRAINING PROGRAM

## 2024-06-14 PROCEDURE — 25510000001: Performed by: STUDENT IN AN ORGANIZED HEALTH CARE EDUCATION/TRAINING PROGRAM

## 2024-06-14 PROCEDURE — 76000 FLUOROSCOPY <1 HR PHYS/QHP: CPT

## 2024-06-14 PROCEDURE — 25510000001 IOPAMIDOL 61 % SOLUTION

## 2024-06-14 PROCEDURE — 25010000002 SUGAMMADEX 200 MG/2ML SOLUTION: Performed by: NURSE ANESTHETIST, CERTIFIED REGISTERED

## 2024-06-14 PROCEDURE — 80053 COMPREHEN METABOLIC PANEL: CPT | Performed by: NURSE PRACTITIONER

## 2024-06-14 PROCEDURE — 85610 PROTHROMBIN TIME: CPT | Performed by: NURSE PRACTITIONER

## 2024-06-14 PROCEDURE — 25810000003 SODIUM CHLORIDE 0.9 % SOLUTION: Performed by: STUDENT IN AN ORGANIZED HEALTH CARE EDUCATION/TRAINING PROGRAM

## 2024-06-14 PROCEDURE — 71045 X-RAY EXAM CHEST 1 VIEW: CPT

## 2024-06-14 PROCEDURE — 99152 MOD SED SAME PHYS/QHP 5/>YRS: CPT

## 2024-06-14 PROCEDURE — G0378 HOSPITAL OBSERVATION PER HR: HCPCS

## 2024-06-14 PROCEDURE — 25010000002 HYDRALAZINE PER 20 MG

## 2024-06-14 PROCEDURE — 25010000002 DEXAMETHASONE PER 1 MG: Performed by: NURSE ANESTHETIST, CERTIFIED REGISTERED

## 2024-06-14 PROCEDURE — 50081 PERQ NL/PL LITHOTRP CPLX>2CM: CPT | Performed by: STUDENT IN AN ORGANIZED HEALTH CARE EDUCATION/TRAINING PROGRAM

## 2024-06-14 PROCEDURE — 25010000002 CEFTRIAXONE PER 250 MG: Performed by: STUDENT IN AN ORGANIZED HEALTH CARE EDUCATION/TRAINING PROGRAM

## 2024-06-14 PROCEDURE — 63710000001 ROSUVASTATIN 20 MG TABLET: Performed by: STUDENT IN AN ORGANIZED HEALTH CARE EDUCATION/TRAINING PROGRAM

## 2024-06-14 DEVICE — URETERAL STENT
Type: IMPLANTABLE DEVICE | Site: URETER | Status: FUNCTIONAL
Brand: PERCUFLEX™ PLUS

## 2024-06-14 RX ORDER — HEPARIN SODIUM 200 [USP'U]/100ML
INJECTION, SOLUTION INTRAVENOUS
Status: DISPENSED
Start: 2024-06-14 | End: 2024-06-14

## 2024-06-14 RX ORDER — LIDOCAINE HYDROCHLORIDE 10 MG/ML
0.5 INJECTION, SOLUTION EPIDURAL; INFILTRATION; INTRACAUDAL; PERINEURAL ONCE AS NEEDED
Status: DISCONTINUED | OUTPATIENT
Start: 2024-06-14 | End: 2024-06-14 | Stop reason: HOSPADM

## 2024-06-14 RX ORDER — SODIUM CHLORIDE 0.9 % (FLUSH) 0.9 %
3 SYRINGE (ML) INJECTION EVERY 12 HOURS SCHEDULED
Status: DISCONTINUED | OUTPATIENT
Start: 2024-06-14 | End: 2024-06-14 | Stop reason: HOSPADM

## 2024-06-14 RX ORDER — ONDANSETRON 2 MG/ML
INJECTION INTRAMUSCULAR; INTRAVENOUS AS NEEDED
Status: DISCONTINUED | OUTPATIENT
Start: 2024-06-14 | End: 2024-06-14 | Stop reason: SURG

## 2024-06-14 RX ORDER — LIDOCAINE HYDROCHLORIDE 10 MG/ML
INJECTION, SOLUTION EPIDURAL; INFILTRATION; INTRACAUDAL; PERINEURAL AS NEEDED
Status: DISCONTINUED | OUTPATIENT
Start: 2024-06-14 | End: 2024-06-14 | Stop reason: SURG

## 2024-06-14 RX ORDER — LABETALOL HYDROCHLORIDE 5 MG/ML
10 INJECTION, SOLUTION INTRAVENOUS EVERY 4 HOURS PRN
Status: DISCONTINUED | OUTPATIENT
Start: 2024-06-14 | End: 2024-06-15 | Stop reason: HOSPADM

## 2024-06-14 RX ORDER — NALOXONE HCL 0.4 MG/ML
0.4 VIAL (ML) INJECTION AS NEEDED
Status: DISCONTINUED | OUTPATIENT
Start: 2024-06-14 | End: 2024-06-14 | Stop reason: HOSPADM

## 2024-06-14 RX ORDER — SODIUM CHLORIDE, SODIUM LACTATE, POTASSIUM CHLORIDE, CALCIUM CHLORIDE 600; 310; 30; 20 MG/100ML; MG/100ML; MG/100ML; MG/100ML
9 INJECTION, SOLUTION INTRAVENOUS CONTINUOUS
Status: DISCONTINUED | OUTPATIENT
Start: 2024-06-14 | End: 2024-06-14

## 2024-06-14 RX ORDER — HYDROCHLOROTHIAZIDE 25 MG/1
12.5 TABLET ORAL
Status: DISCONTINUED | OUTPATIENT
Start: 2024-06-14 | End: 2024-06-15 | Stop reason: HOSPADM

## 2024-06-14 RX ORDER — MIDAZOLAM HYDROCHLORIDE 1 MG/ML
INJECTION INTRAMUSCULAR; INTRAVENOUS AS NEEDED
Status: COMPLETED | OUTPATIENT
Start: 2024-06-14 | End: 2024-06-14

## 2024-06-14 RX ORDER — SODIUM CHLORIDE 0.9 % (FLUSH) 0.9 %
10 SYRINGE (ML) INJECTION EVERY 12 HOURS SCHEDULED
Status: DISCONTINUED | OUTPATIENT
Start: 2024-06-14 | End: 2024-06-14 | Stop reason: HOSPADM

## 2024-06-14 RX ORDER — ONDANSETRON 2 MG/ML
4 INJECTION INTRAMUSCULAR; INTRAVENOUS EVERY 6 HOURS PRN
Status: DISCONTINUED | OUTPATIENT
Start: 2024-06-14 | End: 2024-06-15 | Stop reason: HOSPADM

## 2024-06-14 RX ORDER — ONDANSETRON 4 MG/1
4 TABLET, ORALLY DISINTEGRATING ORAL EVERY 6 HOURS PRN
Status: DISCONTINUED | OUTPATIENT
Start: 2024-06-14 | End: 2024-06-15 | Stop reason: HOSPADM

## 2024-06-14 RX ORDER — LABETALOL HYDROCHLORIDE 5 MG/ML
5 INJECTION, SOLUTION INTRAVENOUS
Status: DISCONTINUED | OUTPATIENT
Start: 2024-06-14 | End: 2024-06-14 | Stop reason: HOSPADM

## 2024-06-14 RX ORDER — MAGNESIUM HYDROXIDE 1200 MG/15ML
LIQUID ORAL AS NEEDED
Status: DISCONTINUED | OUTPATIENT
Start: 2024-06-14 | End: 2024-06-14 | Stop reason: HOSPADM

## 2024-06-14 RX ORDER — SODIUM CHLORIDE 9 MG/ML
40 INJECTION, SOLUTION INTRAVENOUS AS NEEDED
Status: DISCONTINUED | OUTPATIENT
Start: 2024-06-14 | End: 2024-06-14 | Stop reason: HOSPADM

## 2024-06-14 RX ORDER — DEXAMETHASONE SODIUM PHOSPHATE 4 MG/ML
INJECTION, SOLUTION INTRA-ARTICULAR; INTRALESIONAL; INTRAMUSCULAR; INTRAVENOUS; SOFT TISSUE AS NEEDED
Status: DISCONTINUED | OUTPATIENT
Start: 2024-06-14 | End: 2024-06-14 | Stop reason: SURG

## 2024-06-14 RX ORDER — ROCURONIUM BROMIDE 10 MG/ML
INJECTION, SOLUTION INTRAVENOUS AS NEEDED
Status: DISCONTINUED | OUTPATIENT
Start: 2024-06-14 | End: 2024-06-14 | Stop reason: SURG

## 2024-06-14 RX ORDER — DROPERIDOL 2.5 MG/ML
0.62 INJECTION, SOLUTION INTRAMUSCULAR; INTRAVENOUS ONCE AS NEEDED
Status: DISCONTINUED | OUTPATIENT
Start: 2024-06-14 | End: 2024-06-14 | Stop reason: HOSPADM

## 2024-06-14 RX ORDER — PROPOFOL 10 MG/ML
VIAL (ML) INTRAVENOUS AS NEEDED
Status: DISCONTINUED | OUTPATIENT
Start: 2024-06-14 | End: 2024-06-14 | Stop reason: SURG

## 2024-06-14 RX ORDER — HYDRALAZINE HYDROCHLORIDE 20 MG/ML
INJECTION INTRAMUSCULAR; INTRAVENOUS
Status: COMPLETED
Start: 2024-06-14 | End: 2024-06-14

## 2024-06-14 RX ORDER — SODIUM CHLORIDE 0.9 % (FLUSH) 0.9 %
10 SYRINGE (ML) INJECTION AS NEEDED
Status: DISCONTINUED | OUTPATIENT
Start: 2024-06-14 | End: 2024-06-15 | Stop reason: HOSPADM

## 2024-06-14 RX ORDER — LIDOCAINE HYDROCHLORIDE 10 MG/ML
INJECTION, SOLUTION EPIDURAL; INFILTRATION; INTRACAUDAL; PERINEURAL
Status: COMPLETED
Start: 2024-06-14 | End: 2024-06-14

## 2024-06-14 RX ORDER — OXYCODONE HYDROCHLORIDE 5 MG/1
5 TABLET ORAL EVERY 4 HOURS PRN
Status: DISCONTINUED | OUTPATIENT
Start: 2024-06-14 | End: 2024-06-15 | Stop reason: HOSPADM

## 2024-06-14 RX ORDER — ROSUVASTATIN CALCIUM 20 MG/1
40 TABLET, COATED ORAL NIGHTLY
Status: DISCONTINUED | OUTPATIENT
Start: 2024-06-14 | End: 2024-06-15 | Stop reason: HOSPADM

## 2024-06-14 RX ORDER — SODIUM CHLORIDE 9 MG/ML
75 INJECTION, SOLUTION INTRAVENOUS CONTINUOUS
Status: DISCONTINUED | OUTPATIENT
Start: 2024-06-14 | End: 2024-06-15 | Stop reason: HOSPADM

## 2024-06-14 RX ORDER — FAMOTIDINE 10 MG/ML
20 INJECTION, SOLUTION INTRAVENOUS ONCE
Status: CANCELLED | OUTPATIENT
Start: 2024-06-14 | End: 2024-06-14

## 2024-06-14 RX ORDER — SODIUM CHLORIDE 9 MG/ML
40 INJECTION, SOLUTION INTRAVENOUS AS NEEDED
Status: DISCONTINUED | OUTPATIENT
Start: 2024-06-14 | End: 2024-06-15 | Stop reason: HOSPADM

## 2024-06-14 RX ORDER — OXYBUTYNIN CHLORIDE 5 MG/1
5 TABLET ORAL 3 TIMES DAILY PRN
Status: DISCONTINUED | OUTPATIENT
Start: 2024-06-14 | End: 2024-06-15 | Stop reason: HOSPADM

## 2024-06-14 RX ORDER — DROPERIDOL 2.5 MG/ML
0.62 INJECTION, SOLUTION INTRAMUSCULAR; INTRAVENOUS
Status: DISCONTINUED | OUTPATIENT
Start: 2024-06-14 | End: 2024-06-14 | Stop reason: HOSPADM

## 2024-06-14 RX ORDER — IPRATROPIUM BROMIDE AND ALBUTEROL SULFATE 2.5; .5 MG/3ML; MG/3ML
3 SOLUTION RESPIRATORY (INHALATION) ONCE AS NEEDED
Status: DISCONTINUED | OUTPATIENT
Start: 2024-06-14 | End: 2024-06-14 | Stop reason: HOSPADM

## 2024-06-14 RX ORDER — SODIUM CHLORIDE 0.9 % (FLUSH) 0.9 %
3-10 SYRINGE (ML) INJECTION AS NEEDED
Status: DISCONTINUED | OUTPATIENT
Start: 2024-06-14 | End: 2024-06-14 | Stop reason: HOSPADM

## 2024-06-14 RX ORDER — MIDAZOLAM HYDROCHLORIDE 1 MG/ML
INJECTION INTRAMUSCULAR; INTRAVENOUS
Status: DISPENSED
Start: 2024-06-14 | End: 2024-06-14

## 2024-06-14 RX ORDER — HYDROMORPHONE HYDROCHLORIDE 1 MG/ML
0.5 INJECTION, SOLUTION INTRAMUSCULAR; INTRAVENOUS; SUBCUTANEOUS
Status: DISCONTINUED | OUTPATIENT
Start: 2024-06-14 | End: 2024-06-15 | Stop reason: HOSPADM

## 2024-06-14 RX ORDER — MIDAZOLAM HYDROCHLORIDE 1 MG/ML
0.5 INJECTION INTRAMUSCULAR; INTRAVENOUS
Status: DISCONTINUED | OUTPATIENT
Start: 2024-06-14 | End: 2024-06-14 | Stop reason: HOSPADM

## 2024-06-14 RX ORDER — SODIUM CHLORIDE 0.9 % (FLUSH) 0.9 %
10 SYRINGE (ML) INJECTION AS NEEDED
Status: DISCONTINUED | OUTPATIENT
Start: 2024-06-14 | End: 2024-06-14 | Stop reason: HOSPADM

## 2024-06-14 RX ORDER — HYDROMORPHONE HYDROCHLORIDE 1 MG/ML
0.5 INJECTION, SOLUTION INTRAMUSCULAR; INTRAVENOUS; SUBCUTANEOUS
Status: DISCONTINUED | OUTPATIENT
Start: 2024-06-14 | End: 2024-06-14 | Stop reason: HOSPADM

## 2024-06-14 RX ORDER — SODIUM CHLORIDE 0.9 % (FLUSH) 0.9 %
3 SYRINGE (ML) INJECTION EVERY 12 HOURS SCHEDULED
Status: DISCONTINUED | OUTPATIENT
Start: 2024-06-14 | End: 2024-06-15 | Stop reason: HOSPADM

## 2024-06-14 RX ORDER — LOSARTAN POTASSIUM 50 MG/1
50 TABLET ORAL
Status: DISCONTINUED | OUTPATIENT
Start: 2024-06-14 | End: 2024-06-15 | Stop reason: HOSPADM

## 2024-06-14 RX ORDER — PROMETHAZINE HYDROCHLORIDE 25 MG/1
25 TABLET ORAL ONCE AS NEEDED
Status: DISCONTINUED | OUTPATIENT
Start: 2024-06-14 | End: 2024-06-14 | Stop reason: HOSPADM

## 2024-06-14 RX ORDER — FENTANYL CITRATE 50 UG/ML
INJECTION, SOLUTION INTRAMUSCULAR; INTRAVENOUS
Status: DISPENSED
Start: 2024-06-14 | End: 2024-06-14

## 2024-06-14 RX ORDER — FENTANYL CITRATE 50 UG/ML
INJECTION, SOLUTION INTRAMUSCULAR; INTRAVENOUS AS NEEDED
Status: DISCONTINUED | OUTPATIENT
Start: 2024-06-14 | End: 2024-06-14 | Stop reason: SURG

## 2024-06-14 RX ORDER — FENTANYL CITRATE 50 UG/ML
50 INJECTION, SOLUTION INTRAMUSCULAR; INTRAVENOUS
Status: DISCONTINUED | OUTPATIENT
Start: 2024-06-14 | End: 2024-06-14 | Stop reason: HOSPADM

## 2024-06-14 RX ORDER — TAMSULOSIN HYDROCHLORIDE 0.4 MG/1
0.4 CAPSULE ORAL DAILY
Status: DISCONTINUED | OUTPATIENT
Start: 2024-06-15 | End: 2024-06-15 | Stop reason: HOSPADM

## 2024-06-14 RX ORDER — FENTANYL CITRATE 50 UG/ML
INJECTION, SOLUTION INTRAMUSCULAR; INTRAVENOUS AS NEEDED
Status: COMPLETED | OUTPATIENT
Start: 2024-06-14 | End: 2024-06-14

## 2024-06-14 RX ORDER — POLYETHYLENE GLYCOL 3350 17 G/17G
17 POWDER, FOR SOLUTION ORAL DAILY
Status: DISCONTINUED | OUTPATIENT
Start: 2024-06-14 | End: 2024-06-15 | Stop reason: HOSPADM

## 2024-06-14 RX ORDER — PHENYLEPHRINE HCL IN 0.9% NACL 1 MG/10 ML
SYRINGE (ML) INTRAVENOUS AS NEEDED
Status: DISCONTINUED | OUTPATIENT
Start: 2024-06-14 | End: 2024-06-14 | Stop reason: SURG

## 2024-06-14 RX ORDER — HYDROCODONE BITARTRATE AND ACETAMINOPHEN 5; 325 MG/1; MG/1
1 TABLET ORAL ONCE AS NEEDED
Status: DISCONTINUED | OUTPATIENT
Start: 2024-06-14 | End: 2024-06-14 | Stop reason: HOSPADM

## 2024-06-14 RX ORDER — METOPROLOL SUCCINATE 25 MG/1
25 TABLET, EXTENDED RELEASE ORAL NIGHTLY
Status: DISCONTINUED | OUTPATIENT
Start: 2024-06-14 | End: 2024-06-15 | Stop reason: HOSPADM

## 2024-06-14 RX ORDER — PROMETHAZINE HYDROCHLORIDE 25 MG/1
25 SUPPOSITORY RECTAL ONCE AS NEEDED
Status: DISCONTINUED | OUTPATIENT
Start: 2024-06-14 | End: 2024-06-14 | Stop reason: HOSPADM

## 2024-06-14 RX ORDER — FAMOTIDINE 20 MG/1
20 TABLET, FILM COATED ORAL ONCE
Status: COMPLETED | OUTPATIENT
Start: 2024-06-14 | End: 2024-06-14

## 2024-06-14 RX ORDER — ONDANSETRON 2 MG/ML
4 INJECTION INTRAMUSCULAR; INTRAVENOUS ONCE AS NEEDED
Status: DISCONTINUED | OUTPATIENT
Start: 2024-06-14 | End: 2024-06-14 | Stop reason: HOSPADM

## 2024-06-14 RX ORDER — AMOXICILLIN 250 MG
1 CAPSULE ORAL 2 TIMES DAILY
Status: DISCONTINUED | OUTPATIENT
Start: 2024-06-14 | End: 2024-06-15 | Stop reason: HOSPADM

## 2024-06-14 RX ORDER — HYDRALAZINE HYDROCHLORIDE 20 MG/ML
5 INJECTION INTRAMUSCULAR; INTRAVENOUS
Status: DISCONTINUED | OUTPATIENT
Start: 2024-06-14 | End: 2024-06-14 | Stop reason: HOSPADM

## 2024-06-14 RX ADMIN — FENTANYL CITRATE 25 MCG: 50 INJECTION, SOLUTION INTRAMUSCULAR; INTRAVENOUS at 09:10

## 2024-06-14 RX ADMIN — FENTANYL CITRATE 100 MCG: 50 INJECTION, SOLUTION INTRAMUSCULAR; INTRAVENOUS at 12:10

## 2024-06-14 RX ADMIN — FENTANYL CITRATE 25 MCG: 50 INJECTION, SOLUTION INTRAMUSCULAR; INTRAVENOUS at 09:06

## 2024-06-14 RX ADMIN — HYDRALAZINE HYDROCHLORIDE 5 MG: 20 INJECTION INTRAMUSCULAR; INTRAVENOUS at 13:59

## 2024-06-14 RX ADMIN — SUGAMMADEX 200 MG: 100 INJECTION, SOLUTION INTRAVENOUS at 13:30

## 2024-06-14 RX ADMIN — SODIUM CHLORIDE, POTASSIUM CHLORIDE, SODIUM LACTATE AND CALCIUM CHLORIDE: 600; 310; 30; 20 INJECTION, SOLUTION INTRAVENOUS at 12:05

## 2024-06-14 RX ADMIN — MIDAZOLAM HYDROCHLORIDE 0.5 MG: 1 INJECTION, SOLUTION INTRAMUSCULAR; INTRAVENOUS at 09:06

## 2024-06-14 RX ADMIN — ROSUVASTATIN CALCIUM 40 MG: 20 TABLET, COATED ORAL at 20:28

## 2024-06-14 RX ADMIN — LOSARTAN POTASSIUM 50 MG: 50 TABLET, FILM COATED ORAL at 16:04

## 2024-06-14 RX ADMIN — Medication 100 MCG: at 12:11

## 2024-06-14 RX ADMIN — PROPOFOL 200 MG: 10 INJECTION, EMULSION INTRAVENOUS at 12:10

## 2024-06-14 RX ADMIN — HYDRALAZINE HYDROCHLORIDE 5 MG: 20 INJECTION INTRAMUSCULAR; INTRAVENOUS at 14:29

## 2024-06-14 RX ADMIN — CEFTRIAXONE SODIUM 1000 MG: 1 INJECTION, POWDER, FOR SOLUTION INTRAMUSCULAR; INTRAVENOUS at 12:10

## 2024-06-14 RX ADMIN — ONDANSETRON 4 MG: 2 INJECTION INTRAMUSCULAR; INTRAVENOUS at 12:27

## 2024-06-14 RX ADMIN — HYDROCHLOROTHIAZIDE 12.5 MG: 25 TABLET ORAL at 16:04

## 2024-06-14 RX ADMIN — LIDOCAINE HYDROCHLORIDE 1 ML: 10 INJECTION, SOLUTION EPIDURAL; INFILTRATION; INTRACAUDAL; PERINEURAL at 09:30

## 2024-06-14 RX ADMIN — Medication 3 ML: at 16:05

## 2024-06-14 RX ADMIN — POLYETHYLENE GLYCOL 3350 17 G: 17 POWDER, FOR SOLUTION ORAL at 16:05

## 2024-06-14 RX ADMIN — MIDAZOLAM HYDROCHLORIDE 0.5 MG: 1 INJECTION, SOLUTION INTRAMUSCULAR; INTRAVENOUS at 09:10

## 2024-06-14 RX ADMIN — SODIUM CHLORIDE 1000 MG: 900 INJECTION INTRAVENOUS at 08:58

## 2024-06-14 RX ADMIN — LIDOCAINE HYDROCHLORIDE 50 MG: 10 INJECTION, SOLUTION EPIDURAL; INFILTRATION; INTRACAUDAL; PERINEURAL at 12:10

## 2024-06-14 RX ADMIN — IOPAMIDOL 10 ML: 612 INJECTION, SOLUTION INTRAVENOUS at 09:31

## 2024-06-14 RX ADMIN — ROCURONIUM BROMIDE 50 MG: 10 INJECTION INTRAVENOUS at 12:10

## 2024-06-14 RX ADMIN — SODIUM CHLORIDE 75 ML/HR: 900 INJECTION, SOLUTION INTRAVENOUS at 16:04

## 2024-06-14 RX ADMIN — FAMOTIDINE 20 MG: 20 TABLET, FILM COATED ORAL at 11:23

## 2024-06-14 RX ADMIN — DEXAMETHASONE SODIUM PHOSPHATE 4 MG: 4 INJECTION INTRA-ARTICULAR; INTRALESIONAL; INTRAMUSCULAR; INTRAVENOUS; SOFT TISSUE at 12:20

## 2024-06-14 NOTE — H&P
Pre-Op H&P  Rufino Ulloa  [id number]  1945      Chief complaint: Kidney stones      Subjective:  Patient is a 79 y.o.male presents for scheduled surgery by Dr. Pond. He anticipates a RIGHT NEPHROSTOLITHOTOMY PERCUTANEOUS AND CYSTOSCOPY  today. He has history of microscopic hematuria with CT workup demonstrating large 2+ centimeter right renal pelvis stone with no obvious hydronephrosis.  He has additional small nonobstructing stones on the left.  History of kidney stones for years. She underwent right nephrolithotomy percutaneous and cystoscopy and right ureteral stent placement.      Review of Systems:  Constitutional-- No fever, chills or sweats. No fatigue.  CV-- No chest pain, palpitation or syncope. +HTN, HLD, CAD  Resp-- No cough, hemoptysis. +SOB, COPD  Skin--No rashes or lesions      Allergies: No Known Allergies      Home Meds:  Medications Prior to Admission   Medication Sig Dispense Refill Last Dose    brimonidine-timolol (COMBIGAN) 0.2-0.5 % ophthalmic solution Administer 1 drop to both eyes 2 (Two) Times a Day.   6/13/2024    Cholecalciferol (Vitamin D3) 50 MCG (2000 UT) capsule Take 1 capsule by mouth Every Night.   6/13/2024    losartan-hydrochlorothiazide (HYZAAR) 50-12.5 MG per tablet Take 1 tablet by mouth Daily. (Patient taking differently: Take 1 tablet by mouth Every Night.) 90 tablet 3 6/13/2024    Lutein 40 MG capsule Take 40 mg by mouth Every Night.   6/13/2024    potassium chloride (MICRO-K) 10 MEQ CR capsule Take 1 capsule by mouth 2 (Two) Times a Day. 90 capsule 1 6/13/2024    rosuvastatin (CRESTOR) 40 MG tablet Take 1 tablet by mouth Daily. (Patient taking differently: Take 1 tablet by mouth Every Night.) 90 tablet 3 6/13/2024    vitamin B-12 (CYANOCOBALAMIN) 1000 MCG tablet Take 1 tablet by mouth Daily.   6/13/2024    aspirin 81 MG EC tablet Take 1 tablet by mouth Daily.   6/6/2024    metoprolol succinate XL (TOPROL-XL) 25 MG 24 hr tablet Take 1 tablet by mouth every  "night at bedtime. (Patient not taking: Reported on 2024) 90 tablet 3 Not Taking    multivitamin with minerals (Centrum Men) tablet tablet Take 1 tablet by mouth Every Night.       tamsulosin (FLOMAX) 0.4 MG capsule 24 hr capsule Take 1 capsule by mouth Daily. (Patient not taking: Reported on 2024) 30 capsule 0          PMH:   Past Medical History:   Diagnosis Date    Asthma     COPD (chronic obstructive pulmonary disease) Not COPD, but initial stage of emphysema    Coronary artery disease     Glaucoma     History of heart attack     pt states he had a mild heart attack    Hyperlipemia     Hypertension     Kidney stones     Myocardial infarction About     Pneumonia      PSH:    Past Surgical History:   Procedure Laterality Date    COLONOSCOPY      CORONARY STENT PLACEMENT      7 stents from [id number]    TONSILLECTOMY         Social History:   Tobacco:   Social History     Tobacco Use   Smoking Status Former    Current packs/day: 0.00    Average packs/day: 0.7 packs/day for 49.5 years (33.0 ttl pk-yrs)    Types: Cigarettes    Start date: 1960    Quit date: 1998    Years since quittin.4    Passive exposure: Past   Smokeless Tobacco Never   Tobacco Comments    It took me five years to quit smoking.      Alcohol:     Social History     Substance and Sexual Activity   Alcohol Use Not Currently    Comment: I will drink on occasion, but not frequently         Physical Exam:/88 (BP Location: Right arm, Patient Position: Sitting)   Pulse 65   Temp 97.3 °F (36.3 °C) (Temporal)   Resp 16   Ht 175.3 cm (69\")   Wt 78.5 kg (173 lb)   SpO2 99%   BMI 25.55 kg/m²       General Appearance:    Alert, cooperative, no distress, appears stated age   Head:    Normocephalic, without obvious abnormality, atraumatic   Lungs:     Clear to auscultation bilaterally, respirations unlabored    Heart:   Regular rate and rhythm, S1 and S2 normal    Abdomen:    Soft without tenderness   Extremities:   " Extremities normal, atraumatic, no cyanosis or edema   Skin:   Skin color, texture, turgor normal, no rashes or lesions   Neurologic:   Grossly intact     Results Review:     LABS:  Lab Results   Component Value Date    WBC 6.40 06/14/2024    HGB 14.8 06/14/2024    HCT 44.0 06/14/2024    MCV 86.6 06/14/2024     06/14/2024    NEUTROABS 3.55 06/14/2024    GLUCOSE 98 06/14/2024    BUN 16 06/14/2024    CREATININE 0.98 06/14/2024     06/14/2024    K 4.2 06/14/2024     06/14/2024    CO2 27.0 06/14/2024    CALCIUM 9.2 06/14/2024    ALBUMIN 4.1 06/14/2024    AST 24 06/14/2024    ALT 23 06/14/2024    BILITOT 0.5 06/14/2024       RADIOLOGY:  Study Result    Narrative & Impression   IR NEPHROSTOMY TUBE PLACEMENT.     History: nephroureteral drain, middle or lower pole for R PCNL access, same day.       : Nishant Walsh M.D.     Modality: Fluoroscopy.             DOSE REDUCTION: The examination was performed according to departmental dose-optimization program.     Fluoro time: 1.2 minutes     Radiation dose: 2 mGy air Kerma.     SEDATION: Moderate sedation was administered. 1 milligram of Versed and 50 micrograms of fentanyl IV was used for moderate sedation monitored under my direction. Total intra service time of sedation was 17 minutes. The patient's vital signs were   monitored throughout the procedure and recorded in the patient's medical record by the nurse.     Anesthesia:   Lidocaine 1% local infiltration.     Medicines: Rocephin 1 g IV     Contrast medium: Isovue 300, 10 cc.     Estimated blood loss:  < 5 cc.             Technique:  A thorough discussion of the risks, benefits, and alternatives of the procedure, and if applicable, moderate sedation, was carried out with the patient. They were encouraged to ask any questions. Any questions were answered. They verbalized   understanding. A written informed consent was then signed.  A multi-component timeout was performed prior to  starting the procedure using the departmental protocol.      The procedure room personnel used personal protective equipment. The operators used sterile gloves and if indicated, sterile gowns. The surgical site was prepped with chlorhexidine gluconate  and draped in the maximal applicable sterile fashion.     The patient was laid prone on the procedure table. The right percutaneous nephrostomy access site was prepped with chlorhexidine gluconate  and draped in the maximal sterile fashion.     The initial fluoroscopic evaluation was used to localize the target calculus. An oblique access point into the posterior stone harboring calyx giving an approximately 20 degrees lateral angle to the target calyx (blood loss plane) and minimum possible   craniocaudad angulation was selected under ultrasound guidance and marked on the skin. After local anesthesia and dermatotomy, under real-time ultrasound guidance an 18-gauge Chiba needle was advanced into the calyx. After confirming successful entry   into the calyx fluoroscopically or with the recovery of urine, a guidewire was advanced into the collecting system under fluoroscopic guidance. Contrast injection confirmed satisfactory position of the access system. Using a combination of the guidewire   and an angiographic catheter, the collecting system was negotiated and the access system advanced down to the urinary bladder. Entry into the urinary bladder was confirmed with fluoroscopic landmarks and/or contrast injection. A long wire advanced   through the angiographic catheter and coiled in the urinary bladder. The angiographic catheter was coiled and taped to the skin using Tegaderm.     Pertinent ultrasound images were stored to the PACS for documentation.     The patient was transferred to the recovery area and discharged from the department in stable condition.     Complications: None immediate.         Findings:  As above.    Impression:  Successful fluoroscopic guided  percutaneous access into the collecting system of the right kidney via a posterior mid pole calyx for subsequent nephrolithotomy as described above.     Further management dictated by the urologist.                                                                 I reviewed the patient's new clinical results.    Cancer Staging (if applicable)  Cancer Patient: __ yes __no __unknown; If yes, clinical stage T:__ N:__M:__, stage group or __N/A      Impression: Right nephrolithiasis       Plan: RIGHT NEPHROSTOLITHOTOMY PERCUTANEOUS AND CYSTOSCOPY       Lilia Foster, FRANKLYN   6/14/2024   10:54 EDT

## 2024-06-14 NOTE — NURSING NOTE
Image guided nephrostomy access performed by MD Walsh. MD Walsh placed an 4 fr [place] Catheter to RIGHT kidney. Patient tolerated well. Sedation time of 17 minutes. Patient was given 1 mg Versed & 50 mcg Fentanyl. 1g Ceftriaxone given. Report called to Tayler SCHERER.

## 2024-06-14 NOTE — PLAN OF CARE
Goal Outcome Evaluation:      Pt admitted this afternoon from PACU. VSS. NSR. RA. Pt c/o minimal pain. F/C in place draining red tinged urine. Bulky dressing in place to right flank C/D/I. IVF infusing. Will continue to monitor.   Problem: Adult Inpatient Plan of Care  Goal: Plan of Care Review  6/14/2024 1811 by Kaitlin Luu, RN  Outcome: Ongoing, Progressing  Flowsheets  Taken 6/14/2024 1811 by Kaitlin Luu, RN  Progress: no change  Taken 6/14/2024 1229 by Breanna Graves, RN  Plan of Care Reviewed With: patient        Progress: no change

## 2024-06-14 NOTE — ACP (ADVANCE CARE PLANNING)
Patient completed a Living Will naming his niece Seema Escobar as his designate. A copy of the Living Will was scanned into his electronic medical record.

## 2024-06-14 NOTE — PRE-PROCEDURE NOTE
[facility]   Vascular Interventional Radiology  History & Physicial        Patient Name:Rufino Ulloa    : 1945    MRN: [id number]    Primary Care Physician: Maritza Lewis DO    Referring Physician: Bobo Pond MD     Date of admission: 2024    Subjective     Reason for Consult: R PCNL access    History of Present Illness     Rufino Ulloa is a 79 y.o. male referred to IR as noted above.      Active Hospital Problems:  There are no active hospital problems to display for this patient.      Personal History     Past Medical History:   Diagnosis Date    Asthma     COPD (chronic obstructive pulmonary disease) Not COPD, but initial stage of emphysema    Coronary artery disease     Glaucoma     History of heart attack     pt states he had a mild heart attack    Hyperlipemia     Hypertension     Kidney stones     Myocardial infarction About     Pneumonia        Past Surgical History:   Procedure Laterality Date    COLONOSCOPY      CORONARY STENT PLACEMENT      7 stents from [id number]    TONSILLECTOMY         Family History: His family history includes Colon cancer in his mother; HIV in his brother; No Known Problems in his sister.     Social History: He  reports that he quit smoking about 26 years ago. His smoking use included cigarettes. He started smoking about 64 years ago. He has a 33 pack-year smoking history. He has been exposed to tobacco smoke. He has never used smokeless tobacco. He reports that he does not currently use alcohol. He reports that he does not use drugs.    Home Medications:  Lutein, Vitamin D3, aspirin, brimonidine-timolol, losartan-hydrochlorothiazide, metoprolol succinate XL, multivitamin with minerals, potassium chloride, rosuvastatin, tamsulosin, and vitamin B-12    Current Medications:    cefTRIAXone    fentaNYL citrate (PF)    heparin (porcine)    iopamidol    lidocaine PF 1%    midazolam    sodium chloride    sodium chloride    sodium chloride  "    Allergies:  No Known Allergies    Review of Systems    IR Procedure pertinent significant findings are mentioned in the PMH and PSH above.    Objective     Visit Vitals  /92   Pulse 66   Temp 97.8 °F (36.6 °C) (Temporal)   Resp 18   SpO2 99%        Physical Exam    A&Ox3.   Able to communicate  No Apparent Distress  Average physique   CVS: VS as noted. Chart reviewed. Stable for the procedure.  Respiratory: Non labored breathing. No signs of respiratory compromise.    Result Review      I have personally reviewed the results from the time of this admission to 6/14/2024 08:49 EDT and agree with these findings.  [x]  Laboratory  []  Microbiology  [x]  Radiology  []  EKG/Telemetry   []  Cardiology/Vascular   []  Pathology  []  Old records  []  Other:    Most notable findings include: As noted:    Results from last 7 days   Lab Units 06/14/24  0725   INR  0.99   WBC 10*3/mm3 6.40   HEMOGLOBIN g/dL 14.8   HEMATOCRIT % 44.0   PLATELETS 10*3/mm3 229       Results from last 7 days   Lab Units 06/14/24  0725   SODIUM mmol/L 141   POTASSIUM mmol/L 4.2   CHLORIDE mmol/L 103   CO2 mmol/L 27.0   BUN mg/dL 16   CREATININE mg/dL 0.98   EGFR mL/min/1.73 78.0   GLUCOSE mg/dL 98           Lab 06/14/24  0725   TOTAL PROTEIN 6.6   ALBUMIN 4.1   GLOBULIN 2.5   ALT (SGPT) 23   AST (SGOT) 24   BILIRUBIN 0.5   ALK PHOS 58       Estimated Creatinine Clearance: 66.8 mL/min (by C-G formula based on SCr of 0.98 mg/dL).   Creatinine   Date Value Ref Range Status   06/14/2024 0.98 0.76 - 1.27 mg/dL Final       No results found for: \"COVID19\"     No results found for: \"PREGTESTUR\", \"PREGSERUM\", \"HCG\", \"HCGQUANT\"     ASA SCALE ASSESSMENT (applicable ONLY if sedation planned):   2     MALLAMPATI CLASSIFICATION (applicable ONLY if sedation planned):   1    Assessment / Plan     Rufino Ulloa is a 79 y.o. male referred to the IR service with above problem.    Plan:   As above.    Notice: The note was created before the performance of " the procedure. It might have been left in the pending status and signed off after the procedure. The time stamp on the note may be misleading.    Nishant Walsh MD   Vascular Interventional Radiology  06/14/24   8:49 AM EDT

## 2024-06-14 NOTE — POST-PROCEDURE NOTE
The following procedure was performed: PCNL access R    Please see corresponding Radiology report for in detail procedural information. The Radiology report will be dictated shortly, if not done so already. Please see the IR RN note for the information regarding medicines administered if any, isis-procedural vitals and I/O information.

## 2024-06-14 NOTE — PLAN OF CARE
Goal Outcome Evaluation:        Problem: Adult Inpatient Plan of Care  Goal: Plan of Care Review  Outcome: Ongoing, Progressing  Flowsheets  Taken 6/14/2024 1516 by Kaitlin Luu, RN  Progress: no change  Taken 6/14/2024 1229 by Breanna Graves, RN  Plan of Care Reviewed With: patient        Progress: no change

## 2024-06-14 NOTE — ANESTHESIA POSTPROCEDURE EVALUATION
Patient: Rufino lUloa    Procedure Summary       Date: 06/14/24 Room / Location:  [facility] /  [facility]    Anesthesia Start: 1205 Anesthesia Stop: 1354    Procedure: RIGHT NEPHROSTOLITHOTOMY PERCUTANEOUS AND CYSTOSCOPY with antegrade ureteroscopy PLACEMENT OF URTERAL STENT (Right) Diagnosis:       Right nephrolithiasis      (Right nephrolithiasis [N20.0])    Surgeons: Bobo Pond MD Provider: Andrew Burt MD    Anesthesia Type: general ASA Status: 3            Anesthesia Type: general    Vitals  Vitals Value Taken Time   BP     Temp     Pulse 60 06/14/24 1354   Resp     SpO2 100 % 06/14/24 1354   Vitals shown include unfiled device data.  SPO2 100% 2L NC  HR 60 LBBB  RR 15  /104  T 97F      Post Anesthesia Care and Evaluation    Patient location during evaluation: PACU  Patient participation: complete - patient participated  Level of consciousness: awake and alert  Pain management: adequate    Airway patency: patent  Anesthetic complications: No anesthetic complications  PONV Status: none  Cardiovascular status: hemodynamically stable and acceptable  Respiratory status: nonlabored ventilation, acceptable and nasal cannula  Hydration status: acceptable

## 2024-06-14 NOTE — BRIEF OP NOTE
NEPHROSTOLITHOTOMY PERCUTANEOUS AND CYSTOSCOPY  Progress Note    Rufino Ulloa  6/14/2024    Pre-op Diagnosis:   Right nephrolithiasis [N20.0]       Post-Op Diagnosis Codes:     * Right nephrolithiasis [N20.0]       Procedure(s):  RIGHT PERCUTANEOUS NEPHROSTOLITHOTOMY >2 CM  ANTEGRADE RIGHT URETEROSCOPY  ANTEGRADE NEPHROSTOGRAM  ANTEGRADE RIGHT URETERAL STENT      Surgeon(s):  Bobo Pond MD    Anesthesia: General    Staff:   Circulator: Ashkan Pyle, GILMER; Guera Robert RN; Renny Fritz, GILMER; Breanna Graves, RN  Radiology Technologist: Yasmine Anne R.T.(R)  Scrub Person: Esther Atkinson  Nursing Assistant: Lashell Ramachandran         Estimated Blood Loss: minimal    Urine Voided: * No values recorded between 6/14/2024 12:05 PM and 6/14/2024  1:35 PM *    Specimens:                Specimens       ID Source Type Tests Collected By Collected At Frozen?    1 Kidney, Right Calculus STONE ANALYSIS   Bobo Pond MD 6/14/24 1317     Description: RIGHT KIDNEY STONE FOR ANALYSIS    This specimen was not marked as sent.                  Drains:   Nephrostomy Right 4 Fr. (Active)   Dressing Status Clean;Dry;Intact 06/14/24 1010   Dressing Type Dry dressing 06/14/24 1010       Ureteral Drain/Stent Right ureter (Active)       Findings: Uncomplicated RIGHT PCNL >2 cm. All stone removed. Lower pole access obtained by IR. 7 Fr x 26 cm stent on RIGHT. 16 Fr desai.        Complications: None          Bobo Pond MD     Date: 6/14/2024  Time: 13:47 EDT

## 2024-06-14 NOTE — ANESTHESIA PREPROCEDURE EVALUATION
Anesthesia Evaluation     Patient summary reviewed and Nursing notes reviewed   NPO Solid Status: > 8 hours  NPO Liquid Status: > 8 hours           Airway   Mallampati: II  TM distance: >3 FB  Neck ROM: full  Dental          Pulmonary     breath sounds clear to auscultation  Cardiovascular   Exercise tolerance: good (4-7 METS)    Rhythm: regular  Rate: normal    (+) CAD, cardiac stents more than 12 months ago       Neuro/Psych  GI/Hepatic/Renal/Endo      Musculoskeletal     Abdominal    Substance History      OB/GYN          Other                    Anesthesia Plan    ASA 3     general     intravenous induction     Anesthetic plan, risks, benefits, and alternatives have been provided, discussed and informed consent has been obtained with: patient.    CODE STATUS:

## 2024-06-15 VITALS
WEIGHT: 173 LBS | TEMPERATURE: 97.9 F | DIASTOLIC BLOOD PRESSURE: 64 MMHG | HEART RATE: 73 BPM | BODY MASS INDEX: 25.62 KG/M2 | RESPIRATION RATE: 18 BRPM | HEIGHT: 69 IN | SYSTOLIC BLOOD PRESSURE: 108 MMHG | OXYGEN SATURATION: 96 %

## 2024-06-15 PROBLEM — Z86.79 HISTORY OF CORONARY ARTERY DISEASE: Status: ACTIVE | Noted: 2024-06-15

## 2024-06-15 LAB
ANION GAP SERPL CALCULATED.3IONS-SCNC: 13 MMOL/L (ref 5–15)
BUN SERPL-MCNC: 19 MG/DL (ref 8–23)
BUN/CREAT SERPL: 15 (ref 7–25)
CALCIUM SPEC-SCNC: 9.1 MG/DL (ref 8.6–10.5)
CHLORIDE SERPL-SCNC: 101 MMOL/L (ref 98–107)
CO2 SERPL-SCNC: 25 MMOL/L (ref 22–29)
CREAT SERPL-MCNC: 1.27 MG/DL (ref 0.76–1.27)
DEPRECATED RDW RBC AUTO: 41.4 FL (ref 37–54)
EGFRCR SERPLBLD CKD-EPI 2021: 57.1 ML/MIN/1.73
ERYTHROCYTE [DISTWIDTH] IN BLOOD BY AUTOMATED COUNT: 13.2 % (ref 12.3–15.4)
GLUCOSE SERPL-MCNC: 148 MG/DL (ref 65–99)
HCT VFR BLD AUTO: 43.3 % (ref 37.5–51)
HGB BLD-MCNC: 14.5 G/DL (ref 13–17.7)
MCH RBC QN AUTO: 28.8 PG (ref 26.6–33)
MCHC RBC AUTO-ENTMCNC: 33.5 G/DL (ref 31.5–35.7)
MCV RBC AUTO: 85.9 FL (ref 79–97)
PLATELET # BLD AUTO: 222 10*3/MM3 (ref 140–450)
PMV BLD AUTO: 10.4 FL (ref 6–12)
POTASSIUM SERPL-SCNC: 3.8 MMOL/L (ref 3.5–5.2)
RBC # BLD AUTO: 5.04 10*6/MM3 (ref 4.14–5.8)
SODIUM SERPL-SCNC: 139 MMOL/L (ref 136–145)
WBC NRBC COR # BLD AUTO: 12.74 10*3/MM3 (ref 3.4–10.8)

## 2024-06-15 PROCEDURE — A9270 NON-COVERED ITEM OR SERVICE: HCPCS | Performed by: STUDENT IN AN ORGANIZED HEALTH CARE EDUCATION/TRAINING PROGRAM

## 2024-06-15 PROCEDURE — 99024 POSTOP FOLLOW-UP VISIT: CPT | Performed by: STUDENT IN AN ORGANIZED HEALTH CARE EDUCATION/TRAINING PROGRAM

## 2024-06-15 PROCEDURE — 63710000001 SENNOSIDES-DOCUSATE 8.6-50 MG TABLET: Performed by: STUDENT IN AN ORGANIZED HEALTH CARE EDUCATION/TRAINING PROGRAM

## 2024-06-15 PROCEDURE — 63710000001 POLYETHYLENE GLYCOL 17 G PACK: Performed by: STUDENT IN AN ORGANIZED HEALTH CARE EDUCATION/TRAINING PROGRAM

## 2024-06-15 PROCEDURE — 80048 BASIC METABOLIC PNL TOTAL CA: CPT | Performed by: STUDENT IN AN ORGANIZED HEALTH CARE EDUCATION/TRAINING PROGRAM

## 2024-06-15 PROCEDURE — 63710000001 LOSARTAN 50 MG TABLET: Performed by: STUDENT IN AN ORGANIZED HEALTH CARE EDUCATION/TRAINING PROGRAM

## 2024-06-15 PROCEDURE — 97161 PT EVAL LOW COMPLEX 20 MIN: CPT

## 2024-06-15 PROCEDURE — 85027 COMPLETE CBC AUTOMATED: CPT | Performed by: STUDENT IN AN ORGANIZED HEALTH CARE EDUCATION/TRAINING PROGRAM

## 2024-06-15 PROCEDURE — 63710000001 TAMSULOSIN 0.4 MG CAPSULE: Performed by: STUDENT IN AN ORGANIZED HEALTH CARE EDUCATION/TRAINING PROGRAM

## 2024-06-15 PROCEDURE — 63710000001 HYDROCHLOROTHIAZIDE 25 MG TABLET: Performed by: STUDENT IN AN ORGANIZED HEALTH CARE EDUCATION/TRAINING PROGRAM

## 2024-06-15 PROCEDURE — 74420 UROGRAPHY RTRGR +-KUB: CPT | Performed by: STUDENT IN AN ORGANIZED HEALTH CARE EDUCATION/TRAINING PROGRAM

## 2024-06-15 RX ORDER — TAMSULOSIN HYDROCHLORIDE 0.4 MG/1
1 CAPSULE ORAL DAILY
Qty: 15 CAPSULE | Refills: 0 | Status: SHIPPED | OUTPATIENT
Start: 2024-06-15

## 2024-06-15 RX ORDER — ASPIRIN 81 MG/1
81 TABLET ORAL DAILY
Start: 2024-06-17

## 2024-06-15 RX ORDER — OXYBUTYNIN CHLORIDE 5 MG/1
5 TABLET ORAL 3 TIMES DAILY PRN
Qty: 30 TABLET | Refills: 0 | Status: SHIPPED | OUTPATIENT
Start: 2024-06-15

## 2024-06-15 RX ORDER — HYDROCODONE BITARTRATE AND ACETAMINOPHEN 5; 325 MG/1; MG/1
1 TABLET ORAL EVERY 6 HOURS PRN
Qty: 8 TABLET | Refills: 0 | Status: SHIPPED | OUTPATIENT
Start: 2024-06-15

## 2024-06-15 RX ORDER — NITROFURANTOIN 25; 75 MG/1; MG/1
100 CAPSULE ORAL 2 TIMES DAILY
Qty: 6 CAPSULE | Refills: 0 | Status: SHIPPED | OUTPATIENT
Start: 2024-06-15

## 2024-06-15 NOTE — PROGRESS NOTES
[facility]   Urology Progress Note    Patient Name: Rufino Ulloa  : 1945  MRN: [id number]  Primary Care Physician:  Maritza Lewis DO  Date of admission: 2024    Subjective   Subjective     Chief Complaint: Right nephrolithiasis    HPI:  Patient postop day 1 status post right percutaneous nephrolithotomy.  Procedure was uncomplicated.  Postop chest x-ray demonstrates no pneumothorax.  Renal function and hemoglobin stable, mild leukocytosis likely reactive.  Patient denies any pain whatsoever.  He has a catheter in place draining dark urine.  He is eager for discharge home.  He is tolerating food and drink.    Review of Systems   All systems were reviewed and negative except for: None    Objective   Objective     Vitals:   Temp:  [96.1 °F (35.6 °C)-98.2 °F (36.8 °C)] 98.2 °F (36.8 °C)  Heart Rate:  [59-89] 75  Resp:  [15-19] 18  BP: (121-185)/() 121/72  Flow (L/min):  [2-4] 2  Physical Exam    Constitutional: Awake in bed, alert   Eyes: PERRLA, sclerae anicteric, no conjunctival injection   HENT: Normocephalic, atraumatic, mucous membranes moist   Neck: Supple, trachea midline   Respiratory: Equal chest rise, non-labored respirations    Cardiovascular: RRR, palpable radial pulses bilaterally   Gastrointestinal: Soft, nontender, non-distended   Genitourinary: circumcised phallus, orthotopic meatus, bilaterally descended testicles without masses, Lopez catheter in place draining mild cherry urine without clots   Musculoskeletal: No lower extremity edema bilaterally, no clubbing or cyanosis to extremities   Psychiatric: Appropriate affect, cooperative   Neurologic: Oriented x 3,  Cranial Nerves grossly intact, speech clear   Skin: No rashes     Result Review    Result Review:  I have personally reviewed the results from the time of this admission to 6/15/2024 10:39 EDT and agree with these findings:  [x]  Laboratory  []  Microbiology  []  Radiology  []  EKG/Telemetry   []   Cardiology/Vascular   []  Pathology  []  Old records  []  Other:    Most notable findings include: Stable renal function, stable hemoglobin    Assessment & Plan   Assessment / Plan     Brief Patient Summary:  Rufino Ulloa is a 79 y.o. male who is postop day 1 status post right percutaneous nephrolithotomy.  Doing well in the postoperative setting.  Tolerating food and drink, has ambulated.  Catheter removed this morning on rounds, will await trial of void.  He will likely have some persistent hematuria while he heals with catheter in place.  I will plan for follow-up in my office in 2 weeks with repeat CT and stent removal via flexible cystoscopy at that time.  We will plan for discharge on 3 days Macrobid, p.o. Norco, tamsulosin for stent discomfort and antispasmodics for bladder spasms.  Return precautions discussed.    Active Hospital Problems:  Active Hospital Problems    Diagnosis     **Right nephrolithiasis     History of coronary artery disease     Primary hypertension     Hyperlipidemia LDL goal <70        Plan:     - Pain control: P.o. oxycodone, as needed Dilaudid  - Diet/FEN: Regular diet, saline lock IV fluid  - GI: bowel regimen  - : Remove Lopez catheter, trial of void today, maintain ureteral stent for 2 weeks removed in the office  - Heme/ID: Hemoglobin stable, DC antibiotic  - PPx: SCDs  - OOB/Amb  -Disposition: Home today         VTE Prophylaxis:  Mechanical VTE prophylaxis orders are present.        CODE STATUS:   Level Of Support Discussed With: Patient  Code Status (Patient has no pulse and is not breathing): CPR (Attempt to Resuscitate)  Medical Interventions (Patient has pulse or is breathing): Full Support    Disposition:  I expect patient to dc home today.    Electronically signed by Bobo Pond MD, 06/15/24, 10:39 AM EDT.

## 2024-06-15 NOTE — CONSULTS
Patient Name: Rufino Ulloa  MRN: [id number]  : 1945  DOS: 6/15/2024    Attending and requesting physician : Bobo Pond MD    Primary Care Provider: Maritza Lewis DO      Reason for consult: postop medical management/ HTN, post urologic procedure.     Subjective   Patient is a pleasant 79 y.o. male who presented for scheduled procedure by Dr. Pond with urology and Dr. Walsh with interventional radiology.    Patient has been diagnosed with right nephrolithiasis.  He underwent the following procedures  RIGHT PERCUTANEOUS NEPHROSTOLITHOTOMY >2 CM  ANTEGRADE RIGHT URETEROSCOPY  ANTEGRADE NEPHROSTOGRAM  ANTEGRADE RIGHT URETERAL STENT     Procedures was done under general anesthesia, they were tolerated well.    Patient is fairly extensive past medical history including hypertension dyslipidemia, coronary artery disease with history of multiple stents.  He has early stage emphysema that has been stable.    He tells me he has stopped his Toprol-XL due to side effect of feeling his heart pumping hard.    When seen this morning he is doing well with good pain control, no nausea, vomiting, or shortness of breath.no CP.    Allergies:  No Known Allergies     Medications Prior to Admission   Medication Sig Dispense Refill Last Dose    brimonidine-timolol (COMBIGAN) 0.2-0.5 % ophthalmic solution Administer 1 drop to both eyes 2 (Two) Times a Day.   2024    Cholecalciferol (Vitamin D3) 50 MCG ( UT) capsule Take 1 capsule by mouth Every Night.   2024    losartan-hydrochlorothiazide (HYZAAR) 50-12.5 MG per tablet Take 1 tablet by mouth Daily. (Patient taking differently: Take 1 tablet by mouth Every Night.) 90 tablet 3 2024    Lutein 40 MG capsule Take 40 mg by mouth Every Night.   2024    potassium chloride (MICRO-K) 10 MEQ CR capsule Take 1 capsule by mouth 2 (Two) Times a Day. 90 capsule 1 2024    rosuvastatin (CRESTOR) 40 MG tablet Take 1 tablet by mouth Daily.  (Patient taking differently: Take 1 tablet by mouth Every Night.) 90 tablet 3 2024    vitamin B-12 (CYANOCOBALAMIN) 1000 MCG tablet Take 1 tablet by mouth Daily.   2024    aspirin 81 MG EC tablet Take 1 tablet by mouth Daily.   2024    metoprolol succinate XL (TOPROL-XL) 25 MG 24 hr tablet Take 1 tablet by mouth every night at bedtime. (Patient not taking: Reported on 2024) 90 tablet 3 Not Taking    multivitamin with minerals (Centrum Men) tablet tablet Take 1 tablet by mouth Every Night.       tamsulosin (FLOMAX) 0.4 MG capsule 24 hr capsule Take 1 capsule by mouth Daily. (Patient not taking: Reported on 2024) 30 capsule 0          History:   Past Medical History:   Diagnosis Date    Asthma     COPD (chronic obstructive pulmonary disease) Not COPD, but initial stage of emphysema    Coronary artery disease     Glaucoma     History of heart attack     pt states he had a mild heart attack    Hyperlipemia     Hypertension     Kidney stones     Myocardial infarction About     Pneumonia      Past Surgical History:   Procedure Laterality Date    BRONCHOSCOPY      COLONOSCOPY      CORONARY STENT PLACEMENT      7 stents from [id number]    TONSILLECTOMY       Family History   Problem Relation Age of Onset    Colon cancer Mother     No Known Problems Sister     HIV Brother      Social History     Tobacco Use    Smoking status: Former     Current packs/day: 0.00     Average packs/day: 0.7 packs/day for 49.5 years (33.0 ttl pk-yrs)     Types: Cigarettes     Start date: 1960     Quit date: 1998     Years since quittin.4     Passive exposure: Past    Smokeless tobacco: Never    Tobacco comments:     It took me five years to quit smoking.   Vaping Use    Vaping status: Never Used   Substance Use Topics    Alcohol use: Not Currently     Comment: I will drink on occasion, but not frequently    Drug use: Never   Retired    Review of Systems  Pertinent items are noted in HPI    Vital  "Signs  /72 (BP Location: Right arm, Patient Position: Lying)   Pulse 75   Temp 98.2 °F (36.8 °C) (Oral)   Resp 18   Ht 175.3 cm (69\")   Wt 78.5 kg (173 lb)   SpO2 96%   BMI 25.55 kg/m²     Physical Exam:    General Appearance:    Alert, cooperative, in no acute distress   Head:    Normocephalic, without obvious abnormality, atraumatic   Eyes:            Lids and lashes normal, conjunctivae and sclerae normal, no   icterus, no pallor, corneas clear,    Ears:    Ears appear intact with no abnormalities noted   Throat:   No oral lesions, no thrush, oral mucosa moist   Neck:   No adenopathy, supple, trachea midline, no thyromegaly         Lungs:     Clear to auscultation,respirations regular, even and                   unlabored    Heart:    Regular rhythm and normal rate, normal S1 and S2, no    murmur, no gallop   Abdomen:     Normal bowel sounds, no masses, no organomegaly, soft        non-tender, non-distended, no guarding, no rebound                 tenderness  Dressing over back incision clean dry and intact.   Genitalia:    Deferred.  Lopez with blood-tinged urine but no clots.   Extremities:   Moves all extremities well, no edema, no cyanosis, no    redness   Pulses:   Pulses palpable and equal bilaterally   Skin:   No bleeding, bruising or rash   Neurologic:   Cranial nerves 2 - 12 grossly intact, no gross deficit.      I reviewed the patient's new clinical results.       Results from last 7 days   Lab Units 06/14/24  1534 06/14/24  0725   WBC 10*3/mm3 12.12* 6.40   HEMOGLOBIN g/dL 16.1 14.8   HEMATOCRIT % 49.3 44.0   PLATELETS 10*3/mm3 221 229     Results from last 7 days   Lab Units 06/14/24  1534 06/14/24  0725   SODIUM mmol/L 142 141   POTASSIUM mmol/L 4.0 4.2   CHLORIDE mmol/L 102 103   CO2 mmol/L 27.0 27.0   BUN mg/dL 15 16   CREATININE mg/dL 0.99 0.98   CALCIUM mg/dL 9.2 9.2   BILIRUBIN mg/dL  --  0.5   ALK PHOS U/L  --  58   ALT (SGPT) U/L  --  23   AST (SGOT) U/L  --  24   GLUCOSE mg/dL " 102* 98     Lab Results   Component Value Date    HGBA1C 5.40 05/08/2024           Assessment and Plan:   Status post right nephrostolithotomy    Right nephrolithiasis    Primary hypertension    Hyperlipidemia LDL goal <70    History of coronary artery disease      Plan  Admitted for further management.    1.  Early mobilization and ambulation.  2. Pain control-prns   3. IS-encourage  4. DVT proph- mechanicals, oral embolization  5. Bowel regimen  6. Resume home medications as appropriate  7. Monitor post-op labs  8. DC planning     - Hypertension:  Resume home medications as appropriate, formulary substitution when indicated.  Holding parameters.  Prn medications for elevated blood pressure.    -Dyslipidemia:  Resume home regimen statin ( formulary substitution when appropriate).    -CAD: Stable.    Dragon disclaimer:  Part of this encounter note is an electronic transcription/translation of spoken language to printed text. The electronic translation of spoken language may permit erroneous, or at times, nonsensical words or phrases to be inadvertently transcribed; Although I have reviewed the note for such errors, some may still exist.    Leonidas Maloney MD  06/15/24  09:25 EDT

## 2024-06-15 NOTE — PLAN OF CARE
Goal Outcome Evaluation:  Plan of Care Reviewed With: patient        Progress: no change  Outcome Evaluation: PT eval complete. Patient demonstrating complete independence with bed mobility, transfers and gait. Patient demonstrates adequate balance and activity tolerance. Patient does not demonstrate need for skilled PT services at this time as he is completely independent. PT will sign off at this time. Patient provided education about importance of continued mobility to prevent deconditioning. If any changes noted please re-consult PT.      Anticipated Discharge Disposition (PT): home

## 2024-06-15 NOTE — CASE MANAGEMENT/SOCIAL WORK
Case Management Discharge Note      Final Note: Patient's plan is home at discharge.  CM asked to arrange LYFt to transport.  Patient agreeable.  No other needs noted.         Selected Continued Care - Admitted Since 6/14/2024       Destination    No services have been selected for the patient.                Durable Medical Equipment    No services have been selected for the patient.                Dialysis/Infusion    No services have been selected for the patient.                Home Medical Care    No services have been selected for the patient.                Therapy    No services have been selected for the patient.                Community Resources    No services have been selected for the patient.                Community & DME    No services have been selected for the patient.                         Final Discharge Disposition Code: 01 - home or self-care

## 2024-06-15 NOTE — THERAPY EVALUATION
Patient Name: Rufino Ulloa  : 1945    MRN: [id number]                              Today's Date: 6/15/2024       Admit Date: 2024    Visit Dx:     ICD-10-CM ICD-9-CM   1. Right kidney stone  N20.0 592.0   2. Right nephrolithiasis  N20.0 592.0     Patient Active Problem List   Diagnosis    Coronary artery disease involving coronary bypass graft of native heart without angina pectoris    Primary hypertension    Hyperlipidemia LDL goal <70    Microscopic hematuria    Right kidney stone    Staghorn calculus    Right nephrolithiasis    History of coronary artery disease     Past Medical History:   Diagnosis Date    Asthma     COPD (chronic obstructive pulmonary disease) Not COPD, but initial stage of emphysema    Coronary artery disease     Glaucoma     History of heart attack     pt states he had a mild heart attack    Hyperlipemia     Hypertension     Kidney stones     Myocardial infarction About     Pneumonia      Past Surgical History:   Procedure Laterality Date    BRONCHOSCOPY      COLONOSCOPY      CORONARY STENT PLACEMENT      7 stents from 2741-1026    TONSILLECTOMY        General Information       Row Name 06/15/24 1301          Physical Therapy Time and Intention    Document Type evaluation  -KW     Mode of Treatment individual therapy;physical therapy  -KW       Row Name 06/15/24 1301          General Information    Patient Profile Reviewed yes  -KW     Prior Level of Function independent:;all household mobility;community mobility;transfer;gait;bed mobility  -KW     Barriers to Rehab none identified  -KW       Row Name 06/15/24 1301          Living Environment    People in Home alone  -KW       Row Name 06/15/24 1301          Home Main Entrance    Number of Stairs, Main Entrance none  -KW       Row Name 06/15/24 1301          Stairs Within Home, Primary    Number of Stairs, Within Home, Primary none  -KW       Row Name 06/15/24 1301          Cognition    Orientation Status  (Cognition) oriented x 4  -KW               User Key  (r) = Recorded By, (t) = Taken By, (c) = Cosigned By      Initials Name Provider Type    Alisia Saldivar PT Physical Therapist                   Mobility       Row Name 06/15/24 1301          Bed Mobility    Bed Mobility supine-sit-supine  -KW     Supine-Sit-Supine [place] (Bed Mobility) independent  -KW       Row Name 06/15/24 1301          Bed-Chair Transfer    Bed-Chair [place] (Transfers) independent  -KW       Row Name 06/15/24 1301          Sit-Stand Transfer    Sit-Stand [place] (Transfers) independent  -KW       Row Name 06/15/24 1301          Gait/Stairs (Locomotion)    [place] Level (Gait) independent  -KW               User Key  (r) = Recorded By, (t) = Taken By, (c) = Cosigned By      Initials Name Provider Type    Alisia Saldivar PT Physical Therapist                   Obj/Interventions       Row Name 06/15/24 1301          Strength Comprehensive (MMT)    General Manual Muscle Testing (MMT) Assessment no strength deficits identified  -KW       Row Name 06/15/24 1301          Balance    Balance Assessment sitting static balance;sitting dynamic balance;sit to stand dynamic balance;standing static balance;standing dynamic balance  -KW     Static Sitting Balance independent  -KW     Dynamic Sitting Balance independent  -KW     Position, Sitting Balance unsupported;sitting edge of bed  -KW     Sit to Stand Dynamic Balance independent  -KW     Static Standing Balance independent  -KW     Dynamic Standing Balance independent  -KW     Position/Device Used, Standing Balance unsupported  -KW     Balance Interventions sitting;standing;sit to stand;narrowed base of support;single limb stance;tandem standing;eyes closed during activity  -KW               User Key  (r) = Recorded By, (t) = Taken By, (c) = Cosigned By      Initials Name Provider Type    Alisia Saldivar PT Physical Therapist                   Goals/Plan     No documentation.                  Clinical Impression       Row Name 06/15/24 1301          Pain    Pretreatment Pain Rating 0/10 - no pain  -KW       Row Name 06/15/24 1301          Plan of Care Review    Plan of Care Reviewed With patient  -KW     Progress no change  -KW     Outcome Evaluation PT eval complete. Patient demonstrating complete independence with bed mobility, transfers and gait. Patient demonstrates adequate balance and activity tolerance. Patient does not demonstrate need for skilled PT services at this time as he is completely independent. PT will sign off at this time. Patient provided education about importance of continued mobility to prevent deconditioning. If any changes noted please re-consult PT.  -KW       Row Name 06/15/24 1301          Therapy Assessment/Plan (PT)    Criteria for Skilled Interventions Met (PT) no;no problems identified which require skilled intervention  -KW     Therapy Frequency (PT) evaluation only  -KW               User Key  (r) = Recorded By, (t) = Taken By, (c) = Cosigned By      Initials Name Provider Type    Alisia Saldivar, PT Physical Therapist                   Outcome Measures       Row Name 06/15/24 1301          How much help from another person do you currently need...    Turning from your back to your side while in flat bed without using bedrails? 4  -KW     Moving from lying on back to sitting on the side of a flat bed without bedrails? 4  -KW     Moving to and from a bed to a chair (including a wheelchair)? 4  -KW     Standing up from a chair using your arms (e.g., wheelchair, bedside chair)? 4  -KW     Climbing 3-5 steps with a railing? 4  -KW     To walk in hospital room? 4  -KW     AM-PAC 6 Clicks Score (PT) 24  -KW     Highest Level of Mobility Goal 8 --> Walked 250 feet or more  -KW       Row Name 06/15/24 1301          Functional Assessment    Outcome Measure Options AM-PAC 6 Clicks Basic Mobility (PT)  -KW       Row Name 06/15/24 1301           Helm Balance Scale    Sitting to Standing 4  -KW     Standing Unsupported 4  -KW     Sitting with Back Unsupported but Feet Supported on Floor or on Stool 4  -KW     Standing to Sitting 4  -KW     Transfers 4  -KW     Standing Unsupported with Eyes Closed 4  -KW     Standing Unsupported with Feet Together 4  -KW     Reaching Forward with Outstretched Arm While Standing 3  -KW      Object From the Floor From a Standing Position 4  -KW     Turning to Look Behind Over Left and Right Shoulders While Standing 4  -KW     Turn 360 Degrees 4  -KW     Place Alternate Foot on Step or Stool While Standing Unsupported 4  -KW     Standing Unsupported with One Foot in Front 3  -KW     Standing on One Leg 4  -KW     Helm Total Score 54  -KW               User Key  (r) = Recorded By, (t) = Taken By, (c) = Cosigned By      Initials Name Provider Type    Alisia Saldivar, PT Physical Therapist                                   PT Recommendation and Plan     Plan of Care Reviewed With: patient  Progress: no change  Outcome Evaluation: PT eval complete. Patient demonstrating complete independence with bed mobility, transfers and gait. Patient demonstrates adequate balance and activity tolerance. Patient does not demonstrate need for skilled PT services at this time as he is completely independent. PT will sign off at this time. Patient provided education about importance of continued mobility to prevent deconditioning. If any changes noted please re-consult PT.     Time Calculation:   PT Evaluation Complexity  History, PT Evaluation Complexity: 3 or more personal factors and/or comorbidities  Examination of Body Systems (PT Eval Complexity): total of 3 or more elements  Clinical Presentation (PT Evaluation Complexity): stable  Clinical Decision Making (PT Evaluation Complexity): low complexity  Overall Complexity (PT Evaluation Complexity): low complexity     PT Charges       Row Name 06/15/24 7761              Time Calculation    Start Time 1301  -KW      PT Received On 06/15/24  -KW         Untimed Charges    PT Eval/Re-eval Minutes 48  -KW         Total Minutes    Untimed Charges Total Minutes 48  -KW       Total Minutes 48  -KW                User Key  (r) = Recorded By, (t) = Taken By, (c) = Cosigned By      Initials Name Provider Type    Alisia Saldivar, JEFF Physical Therapist                  Therapy Charges for Today       Code Description Service Date Service Provider Modifiers Qty    94412507495 HC PT EVAL LOW COMPLEXITY 4 6/15/2024 Alisia Eason PT GP 1            PT G-Codes  Outcome Measure Options: AM-PAC 6 Clicks Basic Mobility (PT)  AM-PAC 6 Clicks Score (PT): 24  Helm Total Score: 54  PT Discharge Summary  Anticipated Discharge Disposition (PT): home    Alisia Eason PT  6/15/2024

## 2024-06-15 NOTE — PLAN OF CARE
Goal Outcome Evaluation:  Plan of Care Reviewed With: patient        Progress: improving  Outcome Evaluation: VSS on room air. F/C present with blood tinged urine present. No c/o pain/nausea. Pt resting in bed at this time.

## 2024-06-15 NOTE — DISCHARGE SUMMARY
Patient Name: Rufino Ulloa  MRN: [id number]  : 1945  DOS: 6/15/2024    Attending: Bobo Pond MD    Primary Care Provider: Maritza Lewis DO    Date of Admission:.2024  6:52 AM    Date of Discharge:  6/15/2024    Discharge Diagnosis:  Status post right nephrostolithotomy      Right nephrolithiasis    Primary hypertension    Hyperlipidemia LDL goal <70    History of coronary artery disease      Hospital Course   Patient is a pleasant 79 y.o. male who presented for scheduled procedure by Dr. Pond with urology and Dr. Walsh with interventional radiology.     Patient has been diagnosed with right nephrolithiasis.  He underwent the following procedures  RIGHT PERCUTANEOUS NEPHROSTOLITHOTOMY >2 CM  ANTEGRADE RIGHT URETEROSCOPY  ANTEGRADE NEPHROSTOGRAM  ANTEGRADE RIGHT URETERAL STENT     Procedures was done under general anesthesia, they were tolerated well.     Patient is fairly extensive past medical history including hypertension dyslipidemia, coronary artery disease with history of multiple stents.  He has early stage emphysema that has been stable.     He tells me he has stopped his Toprol-XL due to side effect of feeling his heart pumping hard.     When seen this morning he is doing well with good pain control, no nausea, vomiting, or shortness of breath.no CP.    He tolerated his po diet. His pain is controlled.   Voiding trial with Dc later .    F/u with . CT ordered prior to f/u.       Procedures Performed  Procedure(s):  RIGHT NEPHROSTOLITHOTOMY PERCUTANEOUS AND CYSTOSCOPY with antegrade ureteroscopy PLACEMENT OF URTERAL STENT       Pertinent Test Results:    I reviewed the patient's new clinical results.   Results from last 7 days   Lab Units 06/15/24  0905 24  1534 24  0725   WBC 10*3/mm3 12.74* 12.12* 6.40   HEMOGLOBIN g/dL 14.5 16.1 14.8   HEMATOCRIT % 43.3 49.3 44.0   PLATELETS 10*3/mm3 222 221 229     Results from last 7 days   Lab Units  "06/15/24  0905 24  1534 24  0725   SODIUM mmol/L 139 142 141   POTASSIUM mmol/L 3.8 4.0 4.2   CHLORIDE mmol/L 101 102 103   CO2 mmol/L 25.0 27.0 27.0   BUN mg/dL 19 15 16   CREATININE mg/dL 1.27 0.99 0.98   CALCIUM mg/dL 9.1 9.2 9.2   BILIRUBIN mg/dL  --   --  0.5   ALK PHOS U/L  --   --  58   ALT (SGPT) U/L  --   --  23   AST (SGOT) U/L  --   --  24   GLUCOSE mg/dL 148* 102* 98     I reviewed the patient's new imaging including images and reports.        Discharge Assessment:    Vital Signs  Visit Vitals  /72 (BP Location: Right arm, Patient Position: Lying)   Pulse 72   Temp 98.2 °F (36.8 °C) (Oral)   Resp 18   Ht 175.3 cm (69\")   Wt 78.5 kg (173 lb)   SpO2 95%   BMI 25.55 kg/m²     Temp (24hrs), Av.4 °F (36.3 °C), Min:96.1 °F (35.6 °C), Max:98.2 °F (36.8 °C)      General Appearance:    Alert, cooperative, in no acute distress   Lungs:     Clear to auscultation,respirations regular, even and                   unlabored    Heart:    Regular rhythm and normal rate, normal S1 and S2, no            murmur, no gallop, no rub, no click   Abdomen:     Normal bowel sounds, no masses, no organomegaly, soft        non-tender, non-distended, no guarding, no rebound   tenderness  CDI dressing over right flank incision.   Extremities:   Moves all extremities well, no edema, no cyanosis, no    redness  Lopez with pink/blood tinged urine, no clots.    Pulses:   Pulses palpable and equal bilaterally   Skin:   No bleeding, bruising or rash            Discharge Disposition:home.           Discharge Medications        New Medications        Instructions Start Date   HYDROcodone-acetaminophen 5-325 MG per tablet  Commonly known as: Norco   1 tablet, Oral, Every 6 Hours PRN      nitrofurantoin (macrocrystal-monohydrate) 100 MG capsule  Commonly known as: Macrobid   100 mg, Oral, 2 Times Daily      oxybutynin 5 MG tablet  Commonly known as: DITROPAN   5 mg, Oral, 3 Times Daily PRN      tamsulosin 0.4 MG capsule 24 " hr capsule  Commonly known as: FLOMAX   0.4 mg, Oral, Daily      tamsulosin 0.4 MG capsule 24 hr capsule  Commonly known as: FLOMAX   0.4 mg, Oral, Daily             Changes to Medications        Instructions Start Date   aspirin 81 MG EC tablet  What changed: These instructions start on June 17, 2024. If you are unsure what to do until then, ask your doctor or other care provider.   81 mg, Oral, Daily   Start Date: June 17, 2024     losartan-hydrochlorothiazide 50-12.5 MG per tablet  Commonly known as: HYZAAR  What changed: when to take this   1 tablet, Oral, Daily      rosuvastatin 40 MG tablet  Commonly known as: CRESTOR  What changed: when to take this   40 mg, Oral, Daily             Continue These Medications        Instructions Start Date   brimonidine-timolol 0.2-0.5 % ophthalmic solution  Commonly known as: COMBIGAN   1 drop, Both Eyes, 2 Times Daily      Centrum Men tablet tablet   1 tablet, Oral, Nightly      Lutein 40 MG capsule   40 mg, Oral, Nightly      potassium chloride 10 MEQ CR capsule  Commonly known as: MICRO-K   10 mEq, Oral, 2 Times Daily      vitamin B-12 1000 MCG tablet  Commonly known as: CYANOCOBALAMIN   1,000 mcg, Oral, Daily      Vitamin D3 50 MCG (2000 UT) capsule   2,000 Units, Oral, Nightly             Stop These Medications      metoprolol succinate XL 25 MG 24 hr tablet  Commonly known as: TOPROL-XL          Pt will discuss with PCP about a beta blocker.     Discharge Diet: advance as tolerated.     Activity at Discharge:   Ambulate. Restrictions per .    Follow-up Appointments  Monday July 1 with ( CT prior to appt ordered)       Leonidas Maloney MD  06/15/24  10:57 EDT

## 2024-06-16 NOTE — OP NOTE
NEPHROSTOLITHOTOMY PERCUTANEOUS AND CYSTOSCOPY  Procedure Report    Patient Name:  Rufino Ulloa  YOB: 1945    Date of Surgery:  6/14/2024     Indications: 79-year-old gentleman with a 2+ centimeter right renal pelvis stone elects proceed to the operating room today for percutaneous nephrolithotomy.  This morning the patient obtained IR guidance with nephroureteral catheter placement.  Risks discussed, informed consent obtained.    Pre-op Diagnosis:   Right nephrolithiasis [N20.0]       Post-Op Diagnosis Codes:     * Right nephrolithiasis [N20.0]    Procedure(s):  1. RIGHT PERCUTANEOUS NEPHROLITHOTOMY >2 CM  2. FLEXIBLY NEPHROSCOPY  3. ANTEGRADE NEPHROSTOGRAM WITH INTERPRETATION  4. RIGHT ANTEGRADE URETEROSCOPY  5. BASKET STONE EXTRACTION  6. ANTEGRADE RIGHT URETERAL STENT PLACEMENT  7.  CARO CATHETER PLACEMENT    Staff:  Surgeon(s):  Bobo Pond MD         Anesthesia: General    Estimated Blood Loss: minimal    Implants:    Implant Name Type Inv. Item Serial No.  Lot No. LRB No. Used Action   STNT PERCUFLX NO GW [id number] - [id number] Stent STNT PERCUFLX NO GW [id number]  [employer]  Right 1 Implanted       Specimen:          Specimens       ID Source Type Tests Collected By Collected At Frozen?    1 Kidney, Right Calculus STONE ANALYSIS   Bobo Pond MD 6/14/24 0136     Description: RIGHT KIDNEY STONE FOR ANALYSIS                Findings: Uncomplicated right-sided percutaneous nephrolithotomy with lower pole access obtained by IR.  All stone fragmented with trilogy lithotripter and removed.  Flexible nephroscopy and antegrade right ureteroscopy with clearance of remaining stone burden via wire basket.  Antegrade right ureteral stent, 7 Montenegrin by 26 cm.    Complications: None    Description of Procedure:   Informed consent was obtained prior to the procedure. Once in the operating suite, a surgical time-out was performed indicating the correct patient, procedure,  operative site, laterality, desire for antibiotics and expected complications.  A 16 Frisian Lopez catheter was placed under sterile conditions with 10 cc to inflate the balloon, this was hooked up to a drainage bag and secured to the thigh.    The patient was placed in the prone position and all pressure points were carefully padded after induction of anesthesia. The patient's right flank and existing right nephroureteral catheter was then prepped and draped in the usual sterile fashion.    The patient had percutaneous access established same day per IR. The tract was accessed and a stiff wire was passed down into the ureter and into bladder with multiple coils on fluoroscopy. A skin incision was then made along the percutaneous tract. Next, the nephroureteral catheter was offloaded and a dual lumen catheter was passed under fluoroscopic guidance.     Next, an antegrade nephrostogram was then performed as a road map via the dual-lumen catheter.  The collecting system was identified with filling defect in the renal pelvis consistent with location of stone, there is mild contrast extravasation through the percutaneous tract.  A sensor wire was then passed through the dual lumen catheter and the dual-lumen catheter was offloaded. The sensor wire was then secured to the drape and the stiff wire was retained as a working wire.    Next, the percutaneous tract was dilated under fluoroscopic imaging using a NephroMax 30Fr balloon dilator, with  both wires down the ureter to the bladder. The balloon was inflated to 18 Alphonse until the fascia appeared open, the tip of the balloon was kept just at the level of the lower pole infundibulum/calyx. The balloon dilator was then offloaded. Following this, the rigid nephroscope was advanced through the access sheath into the renal collecting system and the renal pelvis stone was identified.    Once the stone was visualized, the trilogy lithotripter was brought into the nephroscope and  lithotripsy was commensed using the ultrasonic and pneumatic lithotriptor. Fragments were suctioned and removed with the lithotripter. Once the renal pelvis stone was fragmented, the collecting system was systematically inspected with the nephroscope, followed by the flexible cystoscope advanced into the collecting system, all calyces were identified. Additional small stones were removed as encountered during nephroscopy.    Once this was complete, antegrade ureteroscopy was performed with the flexible digital ureteroscope.  There were a few smaller stone fragments in the proximal and mid ureter which was removed via the wire basket. Next, the sensor wire was used to pass a 7 Italian by 26 cm double-J stent in antegrade fashion, under direct visualization the wire was removed and a good proximal and distal stent curl were identified on direct visualization and fluoroscopy respectively.    Next, the ureteroscope was removed and sheath with direct visualization.  Finally under live fluoroscopy the remaining sensor wire serving as the safety wire was removed leaving the stent in appropriate position.  Next 10 cc of Floseal was injected through the percutaneous tract.  Next the skin was closed with 2 interrupted subcutaneous 4-0 Monocryl sutures.  Next a pressure gauze was placed with foam tape.    The patient tolerated the procedure well, was awoken in the operative suite and taken to the postoperative care unit for overnight observation admission and a CT scan in the morning.      Disposition  Chest x-ray in PACU to rule out pneumothorax.  Follow-up labs tomorrow.  Voiding trial in the morning.  Return to the office in 2 weeks with repeat CT scan prior to assess for residual stones.  Possible stent removal at the time of his next follow-up pending CT findings.    Bobo Pond MD     Date: 6/16/2024  Time: 11:19 EDT

## 2024-06-17 ENCOUNTER — TELEPHONE (OUTPATIENT)
Dept: UROLOGY | Facility: CLINIC | Age: 79
End: 2024-06-17
Payer: COMMERCIAL

## 2024-06-17 ENCOUNTER — TELEPHONE (OUTPATIENT)
Dept: INFUSION THERAPY | Facility: HOSPITAL | Age: 79
End: 2024-06-17
Payer: COMMERCIAL

## 2024-06-17 NOTE — TELEPHONE ENCOUNTER
----- Message from Bobo Pond sent at 6/15/2024 10:44 AM EDT -----  Regarding: post op stent removal  S/p R perc nephrolithomy    Arrange follow up Monday July 1 in [place] with CT scan (ordered) prior thank you    Bobo Pond MD

## 2024-06-23 LAB
CALCIUM OXALATE DIHYDRATE MFR STONE IR: 80 %
COLOR STONE: NORMAL
COM MFR STONE: 20 %
COMPN STONE: NORMAL
LABORATORY COMMENT REPORT: NORMAL
LABORATORY COMMENT REPORT: NORMAL
Lab: NORMAL
Lab: NORMAL
PHOTO: NORMAL
SIZE STONE: NORMAL MM
SPEC SOURCE SUBJ: NORMAL
WT STONE: 845 MG

## 2024-06-25 ENCOUNTER — TELEPHONE (OUTPATIENT)
Dept: FAMILY MEDICINE CLINIC | Facility: CLINIC | Age: 79
End: 2024-06-25

## 2024-06-25 NOTE — TELEPHONE ENCOUNTER
"Caller: Rufino Ulloa    Relationship: Self    Best call back number: [phone]     Who is your current provider: SORAYA BAEZ DO    Is your current provider offboarding? NO    Who would you like your new provider to be: LISA GUILLEN MD    What are your reasons for transferring care: PATIENT UNSATISFIED WITH CARE, MEDICATIONS WERE \"CANCELLED\".    Additional notes: PLEASE CALL PATIENT BACK ONCE APPROVED TO GET SCHEDULED.           "

## 2024-06-26 NOTE — TELEPHONE ENCOUNTER
HUB TO READ      Called and lm for pt to let him know Dr. Serra is not taking internal transfers at this time.

## 2024-07-01 ENCOUNTER — PROCEDURE VISIT (OUTPATIENT)
Age: 79
End: 2024-07-01
Payer: COMMERCIAL

## 2024-07-01 VITALS — SYSTOLIC BLOOD PRESSURE: 159 MMHG | DIASTOLIC BLOOD PRESSURE: 85 MMHG | OXYGEN SATURATION: 100 % | HEART RATE: 64 BPM

## 2024-07-01 DIAGNOSIS — N20.0 RIGHT KIDNEY STONE: Primary | ICD-10-CM

## 2024-07-01 LAB
BILIRUB BLD-MCNC: NEGATIVE MG/DL
CLARITY, POC: CLEAR
COLOR UR: YELLOW
EXPIRATION DATE: ABNORMAL
GLUCOSE UR STRIP-MCNC: NEGATIVE MG/DL
KETONES UR QL: NEGATIVE
LEUKOCYTE EST, POC: ABNORMAL
Lab: ABNORMAL
NITRITE UR-MCNC: NEGATIVE MG/ML
PH UR: 6.5 [PH] (ref 5–8)
PROT UR STRIP-MCNC: ABNORMAL MG/DL
RBC # UR STRIP: NEGATIVE /UL
SP GR UR: 1.01 (ref 1–1.03)
UROBILINOGEN UR QL: NORMAL

## 2024-07-01 PROCEDURE — 52310 CYSTOSCOPY AND TREATMENT: CPT | Performed by: STUDENT IN AN ORGANIZED HEALTH CARE EDUCATION/TRAINING PROGRAM

## 2024-07-01 PROCEDURE — 81003 URINALYSIS AUTO W/O SCOPE: CPT | Performed by: STUDENT IN AN ORGANIZED HEALTH CARE EDUCATION/TRAINING PROGRAM

## 2024-07-01 NOTE — PROGRESS NOTES
Procedure: Flexible Cystoscopy with Stent Removal     Preoperative Diagnosis: Right nephrolithiasis    Postoperative Diagnosis: Right nephrolithiasis    Procedure performed: Cystoscopy with Right ureteral stent removal     Surgeon: Bobo Pond MD    Anesthesia: 2% Lidocaine Jelly    Indications: Rufino Ulloa is a 79 y.o. year old male with a history of right renal pelvis stone who underwent right percutaneous nephrolithotomy and right stent placed. A ureteral stent was left in place. The patient returns for planned ureteral stent removal today.     Procedure: Patient was taken to the urology procedure suite and prepped and draped in sterile fashion. A pre-procedural identification was performed. 10 cc of 2% lidocaine jelly was injected into the urethra. After adequate anesthesia, a lubricated flexible cystoscope was inserted into the urethra. The urethra appeared normal. The urinary sphincter was intact. The bladder was entered and 360 degree panendoscopy was performed revealing normal bladder anatomy, bilateral orthotopic ureteral orifices, and no concern for bladder stones, trabeculations, mucosal lesions/tumors, or divetrticuli. The right stent was in good position emanating from the ureteral orifice.      The right stent was grasped with a pair of grasping forceps and was removed without difficulty.     PLAN    1) Discharge home    2) Follow up: 2 weeks with CT scan prior to rule out residual stones    3) Antibiotic prophylaxis: Previously prescribed Macrobid, return precautions for infection, retention, fevers, chills, nausea, flank pain discussed at    Bobo Pond MD

## 2024-07-12 ENCOUNTER — HOSPITAL ENCOUNTER (OUTPATIENT)
Facility: HOSPITAL | Age: 79
Discharge: HOME | End: 2024-07-12
Payer: COMMERCIAL

## 2024-07-12 DIAGNOSIS — N20.0 RIGHT KIDNEY STONE: ICD-10-CM

## 2024-07-12 PROCEDURE — 74176 CT ABD & PELVIS W/O CONTRAST: CPT

## 2024-07-22 ENCOUNTER — OFFICE VISIT (OUTPATIENT)
Age: 79
End: 2024-07-22
Payer: COMMERCIAL

## 2024-07-22 VITALS — HEART RATE: 67 BPM | SYSTOLIC BLOOD PRESSURE: 135 MMHG | DIASTOLIC BLOOD PRESSURE: 80 MMHG | OXYGEN SATURATION: 96 %

## 2024-07-22 DIAGNOSIS — N20.0 RIGHT NEPHROLITHIASIS: Primary | ICD-10-CM

## 2024-07-22 PROCEDURE — 1159F MED LIST DOCD IN RCRD: CPT | Performed by: STUDENT IN AN ORGANIZED HEALTH CARE EDUCATION/TRAINING PROGRAM

## 2024-07-22 PROCEDURE — 1160F RVW MEDS BY RX/DR IN RCRD: CPT | Performed by: STUDENT IN AN ORGANIZED HEALTH CARE EDUCATION/TRAINING PROGRAM

## 2024-07-22 PROCEDURE — 99024 POSTOP FOLLOW-UP VISIT: CPT | Performed by: STUDENT IN AN ORGANIZED HEALTH CARE EDUCATION/TRAINING PROGRAM

## 2024-07-22 PROCEDURE — 3075F SYST BP GE 130 - 139MM HG: CPT | Performed by: STUDENT IN AN ORGANIZED HEALTH CARE EDUCATION/TRAINING PROGRAM

## 2024-07-22 PROCEDURE — 3079F DIAST BP 80-89 MM HG: CPT | Performed by: STUDENT IN AN ORGANIZED HEALTH CARE EDUCATION/TRAINING PROGRAM

## 2024-07-22 NOTE — PROGRESS NOTES
Follow Up Office Visit      Patient Name: Rufino Ulloa  : 1945   MRN: [id number]     Chief Complaint:    Chief Complaint   Patient presents with    Right kidney stone       Referring Provider: Maritza Lewis DO    History of Present Illness: Rufino Ulloa is a 79 y.o. male who presents today for follow up of right nephrolithiasis. He had a >2 cm right renal pelvis stone identified on CT for workup of gross hematuria. He underwent a R PCNL on 6/15/24 which was uncomplicated. He returns today for follow up with CT prior demonstrating resolution of large right kidney stone. He has a tiny residual 1-2 mm stone in left kidney, there is no hydronephrosis. He drinks >2 L daily. No flank pain or hematuria. Does not eat red meat, usually salmon a few times a week.     Subjective      Review of System: Review of Systems   Genitourinary: Negative.       I have reviewed the ROS documented by my clinical staff, I have updated appropriately and I agree. Bobo Pond MD    I have reviewed and the following portions of the patient's history were updated as appropriate: past family history, past medical history, past social history, past surgical history and problem list.    Medications:     Current Outpatient Medications:     aspirin 81 MG EC tablet, Take 1 tablet by mouth Daily., Disp: , Rfl:     brimonidine-timolol (COMBIGAN) 0.2-0.5 % ophthalmic solution, Administer 1 drop to both eyes 2 (Two) Times a Day., Disp: , Rfl:     Cholecalciferol (Vitamin D3) 50 MCG (2000) capsule, Take 1 capsule by mouth Every Night., Disp: , Rfl:     HYDROcodone-acetaminophen (Norco) 5-325 MG per tablet, Take 1 tablet by mouth Every 6 (Six) Hours As Needed for Severe Pain., Disp: 8 tablet, Rfl: 0    losartan-hydrochlorothiazide (HYZAAR) 50-12.5 MG per tablet, Take 1 tablet by mouth Daily. (Patient taking differently: Take 1 tablet by mouth Every Night.), Disp: 90 tablet, Rfl: 3    Lutein 40 MG capsule, Take 40 mg by  mouth Every Night., Disp: , Rfl:     multivitamin with minerals (Centrum Men) tablet tablet, Take 1 tablet by mouth Every Night., Disp: , Rfl:     potassium chloride (MICRO-K) 10 MEQ CR capsule, Take 1 capsule by mouth 2 (Two) Times a Day., Disp: 90 capsule, Rfl: 1    rosuvastatin (CRESTOR) 40 MG tablet, Take 1 tablet by mouth Daily. (Patient taking differently: Take 1 tablet by mouth Every Night.), Disp: 90 tablet, Rfl: 3    vitamin B-12 (CYANOCOBALAMIN) 1000 MCG tablet, Take 1 tablet by mouth Daily., Disp: , Rfl:     Allergies:   No Known Allergies    IPSS Questionnaire (AUA-7):  Over the past month…    1)  Incomplete Emptying:       How often have you had a sensation of not emptying you had the sensation of not emptying your bladder completely after you finished urinating?  1 - Less than 1 time in 5   2)  Frequency:       How often have you had the urinate again less than two hours after you finished urinating?  2 - Less than half the time   3)  Intermittency:       How often have you found you stopped and started again several times when you urinated?   2 - Less than half the time   4) Urgency:      How often have you found it difficult to postpone urination?  2 - Less than half the time   5) Weak Stream:      How often have you had a weak urinary stream?  0 - Not at all   6) Straining:       How often have you had to push or strain to begin urination?  0 - Not at all   7) Nocturia:      How many times did you most typically get up to urinate from the time you went to bed at night until the time you got up in the morning?  2 - 2 times   Total Score:  9   The International Prostate Symptom Score (IPSS) is used to screen, diagnose, track symptoms of benign prostatic hyperplasia (BPH).   0-7 (Mild Symptoms) 8-19 (Moderate) 20-35 (Severe)   Quality of Life (QoL):  If you were to spend the rest of your life with your urinary condition just the way it is now, how would you feel about that? 2-Mostly Satisfied   Urine  Leakage (Incontinence) 0-No Leakage       Objective     Physical Exam:   Vital Signs:   Vitals:    07/22/24 1552   BP: 135/80   Pulse: 67   SpO2: 96%     There is no height or weight on file to calculate BMI.     Physical Exam  Constitutional:       Appearance: Normal appearance.   HENT:      Head: Normocephalic and atraumatic.      Nose: Nose normal.   Eyes:      Extraocular Movements: Extraocular movements intact.      Conjunctiva/sclera: Conjunctivae normal.      Pupils: Pupils are equal, round, and reactive to light.   Musculoskeletal:         General: Normal range of motion.      Cervical back: Normal range of motion and neck supple.   Skin:     General: Skin is warm and dry.      Findings: No lesion or rash.   Neurological:      General: No focal deficit present.      Mental Status: He is alert and oriented to person, place, and time. Mental status is at baseline.   Psychiatric:         Mood and Affect: Mood normal.         Behavior: Behavior normal.         Labs:   Brief Urine Lab Results  (Last result in the past 365 days)        Color   Clarity   Blood   Leuk Est   Nitrite   Protein   CREAT   Urine HCG        07/01/24 1024 Yellow   Clear   Negative   Trace   Negative   1+                   Urine Culture          4/1/2024    14:36   Urine Culture   Urine Culture No growth         Lab Results   Component Value Date    GLUCOSE 148 (H) 06/15/2024    CALCIUM 9.1 06/15/2024     06/15/2024    K 3.8 06/15/2024    CO2 25.0 06/15/2024     06/15/2024    BUN 19 06/15/2024    CREATININE 1.27 06/15/2024    BCR 15.0 06/15/2024    ANIONGAP 13.0 06/15/2024       Lab Results   Component Value Date    WBC 12.74 (H) 06/15/2024    HGB 14.5 06/15/2024    HCT 43.3 06/15/2024    MCV 85.9 06/15/2024     06/15/2024       Images:   CT Abdomen Pelvis Stone Protocol    Result Date: 7/12/2024  Mild right-sided hydroureteronephrosis with no calculus along the course of the right ureter. Electronically Signed: Keanu  MD Ernie  7/12/2024 5:00 PM EDT  Workstation ID: [id number]    XR Chest 1 View    Result Date: 6/14/2024  Impression: 1. No evidence of pneumothorax or other acute cardiopulmonary disease. Electronically Signed: Omar Grover MD  6/14/2024 2:50 PM EDT  Workstation ID: [id number]    IR Nephrostomy Tube Placement    Result Date: 6/14/2024  Impression: Successful fluoroscopic guided percutaneous access into the collecting system of the right kidney via a posterior mid pole calyx for subsequent nephrolithotomy as described above. Further management dictated by the urologist.                                                          Thank you for the opportunity to assist in the care of your patient. Electronically Signed: Nishant Walsh MD  6/14/2024 9:39 AM EDT  Workstation ID: [id number]    CT Abdomen Pelvis Stone Protocol    Result Date: 3/26/2024  Impression: 1.No acute abnormality identified within the abdomen or pelvis. 2.Bilateral nonobstructive nephrolithiasis, including a 20 x 15 m nonobstructing calculus in the right renal pelvis. There is stranding about the right renal pelvis. 3.Colonic diverticulosis. 4.Additional findings as detailed above. Electronically Signed: Lee Cardoso MD  3/26/2024 3:24 PM EDT  Workstation ID: [id number]      Measures:   Tobacco:   Rufino Ulloa  reports that he quit smoking about 26 years ago. His smoking use included cigarettes. He started smoking about 64 years ago. He has a 38 pack-year smoking history. He has been exposed to tobacco smoke. He has never used smokeless tobacco.      Assessment / Plan      Assessment/Plan:   79 y.o. male who presented today for follow up of right nephrolithiasis. S/p R PCNL. CT demonstrates complete resolution of right kidney stone. Has healed well. No perinephric fluid or hydronephrosis. We discussed further metabolic testing with labs and 24 hour urine, he defers further metabolic evaluation. F/u 1 year with K[facility] prior for stone surveillance.      Diagnoses and all orders for this visit:    1. Right nephrolithiasis (Primary)  -     XR Abdomen KUB; Future           Follow Up:   Return in about 1 year (around 7/22/2025).    I spent approximately 20 minutes providing clinical care for this patient; including review of patient's chart and provider documentation, face to face time spent with patient in examination room (obtaining history, performing physical exam, discussing diagnosis and management options), placing orders, and completing patient documentation.     Bobo Pond MD  [facility] [place]

## 2024-10-24 ENCOUNTER — TELEPHONE (OUTPATIENT)
Dept: FAMILY MEDICINE CLINIC | Facility: CLINIC | Age: 79
End: 2024-10-24
Payer: COMMERCIAL

## 2024-10-24 NOTE — TELEPHONE ENCOUNTER
Angeline from [facility]. States insurance needs a referral for every visit, due to the type of insurance the pt has.     Fax # [phone]    They need one from 09/20/24 visit. And will need one for 01/24/25

## 2024-10-28 NOTE — TELEPHONE ENCOUNTER
This has been done and is pending.   Ref# [id number]    Will fax when authorized.    Jeny at [facility] notified.

## 2025-02-18 RX ORDER — LOSARTAN POTASSIUM AND HYDROCHLOROTHIAZIDE 12.5; 5 MG/1; MG/1
1 TABLET ORAL DAILY
Qty: 90 TABLET | Refills: 0 | Status: SHIPPED | OUTPATIENT
Start: 2025-02-18

## 2025-07-23 DIAGNOSIS — Z87.442 HISTORY OF NEPHROLITHIASIS: Primary | ICD-10-CM

## 2025-07-25 ENCOUNTER — TELEPHONE (OUTPATIENT)
Age: 80
End: 2025-07-25

## 2025-07-25 NOTE — TELEPHONE ENCOUNTER
I called and left a  for patient to give us a call back to reschedule his appointment until he has been able to get his xray completed.

## (undated) DEVICE — ST EXT IV TBG W SECUR LK 20IN

## (undated) DEVICE — SUT SILK 2/0 TIES 18IN A185H

## (undated) DEVICE — BOWL UTIL STRL 32OZ

## (undated) DEVICE — NITINOL WIRE WITH HYDROPHILIC TIP: Brand: SENSOR

## (undated) DEVICE — COVER,MAYO STAND,STERILE: Brand: MEDLINE

## (undated) DEVICE — 3M™ MEDIPORE™ H SOFT CLOTH SURGICAL TAPE, 2863, 3 IN X 10 YD, 12/CASE: Brand: 3M™ MEDIPORE™

## (undated) DEVICE — HIGH PRESSURE NEPHROSTOMY BALLOON CATHETER KIT: Brand: NEPHROMAX KIT

## (undated) DEVICE — NEEDLE, QUINCKE, 18GX3.5": Brand: MEDLINE

## (undated) DEVICE — DUAL LUMEN URETERAL CATHETER

## (undated) DEVICE — 6617 IOBAN II PATIENT ISOLATION DRAPE 5/BX,4BX/CS: Brand: STERI-DRAPE™ IOBAN™ 2

## (undated) DEVICE — DILATOR/SHEATH SET: Brand: 8/10 DILATOR/SHEATH SET

## (undated) DEVICE — POSTN ARM CRDL LAMIN PK/2

## (undated) DEVICE — STPCK 4WY ON/OFF VLV M/COLAR FIT 45PSI STRL

## (undated) DEVICE — CATH URETRL FLXITP POLLACK STD 5F 70CM

## (undated) DEVICE — SNAP KOVER: Brand: UNBRANDED

## (undated) DEVICE — LEX BASIC NO DRAPE: Brand: MEDLINE INDUSTRIES, INC.

## (undated) DEVICE — TUBING, SUCTION, 1/4" X 10', STRAIGHT: Brand: MEDLINE

## (undated) DEVICE — HDRST INTUB GENTLETOUCH SLOT 7IN RT

## (undated) DEVICE — JELLY,LUBE,STERILE,FLIP TOP,TUBE,2-OZ: Brand: MEDLINE

## (undated) DEVICE — SYR LUERLOK 50ML

## (undated) DEVICE — GW AMPLTZ SUPERSTIFF STR .035IN 180CM

## (undated) DEVICE — SYR LUERLOK 20CC BX/50

## (undated) DEVICE — 3M™ IOBAN™ 2 ANTIMICROBIAL INCISE DRAPE 6650EZ: Brand: IOBAN™ 2

## (undated) DEVICE — TP CLTH ADH PORUS 2IN 10YD

## (undated) DEVICE — PK CYSTO-TUR BASIC 10

## (undated) DEVICE — Y-TYPE TUR/BLADDER IRRIGATION SET, REGULATING CLAMP

## (undated) DEVICE — INTENDED FOR TISSUE SEPARATION, AND OTHER PROCEDURES THAT REQUIRE A SHARP SURGICAL BLADE TO PUNCTURE OR CUT.: Brand: BARD-PARKER ® STAINLESS STEEL BLADES

## (undated) DEVICE — ADHS LIQ MASTISOL 2/3ML

## (undated) DEVICE — GLV SURG BIOGEL LTX PF 8

## (undated) DEVICE — KT PROB SWISS LITHOCLAST TRILOGY 3.4X340MM GRN

## (undated) DEVICE — GOWN,NON-REINFORCED,SIRUS,SET IN SLV,XXL: Brand: MEDLINE